# Patient Record
Sex: FEMALE | Race: WHITE | NOT HISPANIC OR LATINO | Employment: UNEMPLOYED | URBAN - METROPOLITAN AREA
[De-identification: names, ages, dates, MRNs, and addresses within clinical notes are randomized per-mention and may not be internally consistent; named-entity substitution may affect disease eponyms.]

---

## 2017-10-08 ENCOUNTER — HOSPITAL ENCOUNTER (EMERGENCY)
Facility: HOSPITAL | Age: 52
Discharge: HOME | End: 2017-10-08
Admitting: EMERGENCY MEDICINE
Payer: COMMERCIAL

## 2017-10-08 VITALS
DIASTOLIC BLOOD PRESSURE: 79 MMHG | HEART RATE: 76 BPM | RESPIRATION RATE: 20 BRPM | OXYGEN SATURATION: 98 % | TEMPERATURE: 97.5 F | WEIGHT: 196.21 LBS | SYSTOLIC BLOOD PRESSURE: 129 MMHG

## 2017-10-08 DIAGNOSIS — L01.00 IMPETIGO ANY SITE: Primary | ICD-10-CM

## 2017-10-08 PROCEDURE — 99282 EMERGENCY DEPT VISIT SF MDM: CPT

## 2017-10-08 RX ORDER — IBUPROFEN 600 MG/1
600 TABLET ORAL ONCE
Status: COMPLETED | OUTPATIENT
Start: 2017-10-08 | End: 2017-10-08

## 2017-10-08 RX ORDER — CEPHALEXIN 500 MG/1
500 CAPSULE ORAL EVERY 6 HOURS SCHEDULED
Qty: 40 CAPSULE | Refills: 0 | Status: SHIPPED | OUTPATIENT
Start: 2017-10-08 | End: 2017-10-18

## 2017-10-08 RX ORDER — CEPHALEXIN 250 MG/1
500 CAPSULE ORAL ONCE
Status: COMPLETED | OUTPATIENT
Start: 2017-10-08 | End: 2017-10-08

## 2017-10-08 RX ADMIN — MUPIROCIN: 20 OINTMENT TOPICAL at 05:00

## 2017-10-08 RX ADMIN — CEPHALEXIN 500 MG: 250 CAPSULE ORAL at 04:53

## 2017-10-08 RX ADMIN — IBUPROFEN 600 MG: 600 TABLET, FILM COATED ORAL at 04:59

## 2017-10-08 NOTE — DISCHARGE INSTRUCTIONS
You appear to have a facial infection which is impetigo  You have been prescribed keflex - take as prescribed  You have been prescribed Bactroban ointment - apply to face two times daily x 5 days  You are to wash your face with soap and water   See your PCP for follow up      [id number] [address]:   Impetigo is a skin infection caused by bacteria  The infection can cause sores to form anywhere on your body  The sores develop watery or pus-filled blisters that break and form thick crusts  Impetigo is most common in children and spreads easily from person to person  DISCHARGE INSTRUCTIONS:   Return to the emergency department if:   · You have painful, red, warm skin around the blisters  · Your face is swollen  · You urinate less than usual or there is blood in your urine  Contact your healthcare provider if:   · You have a fever  · The sores become more red, swollen, warm, or tender  · The sores do not start to heal after 3 days of treatment  · You have questions or concerns about your condition or care  Medicines:   · Antibiotics  treat the bacterial infection  Antibiotics may be given as a pill or cream  Wash your skin and gently remove any crusts before you apply the antibiotic cream      · Take your medicine as directed  Contact your healthcare provider if you think your medicine is not helping or if you have side effects  Tell him or her if you are allergic to any medicine  Keep a list of the medicines, vitamins, and herbs you take  Include the amounts, and when and why you take them  Bring the list or the pill bottles to follow-up visits  Carry your medicine list with you in case of an emergency  Prevent the spread of impetigo:   · Avoid direct contact  You can spread impetigo if someone touches or uses something that touched your infected skin  You can also spread impetigo on your own body when you touch the area and then touch somewhere else   Keep the sores covered with gauze so you will not scratch or touch them  Keep your fingernails short  Your child may need to wear mittens so he does not scratch his sores  · Wash your hands often  Always wash your hands after you touch the infected area  Wash your hands before you touch food, your eyes, or other people  If no water is available, use an alcohol-based gel to clean your hands  · Wash household items  Do not share or reuse items that have come in contact with impetigo sores  Examples include bedding, towels, washcloths, and eating utensils  These items may be used again after they have been washed with hot water and soap  Clean your sores safely:  Wash your skin sores with antibacterial soap and water  You may need to do this 2 to 3 times each day until the sores heal  If the area is crusted, gently wash the sores with gauze or a clean washcloth to remove the crust  Pat the area dry with a clean towel  Wash your hands, the washcloth, and the towel after you clean the area around the sores  Return to work or school: You may return to work or school 48 hours after you start the antibiotic medicine  If your child has impetigo, tell his school or  center about the infection  Follow up with your healthcare provider as directed:  Write down your questions so you remember to ask them during your visits  © 2017 [address] [employer] Information is for End User's use only and may not be sold, redistributed or otherwise used for commercial purposes  All illustrations and images included in CareNotes® are the copyrighted property of A D [employer] M [employer]  or [employer]  The above information is an  only  It is not intended as medical advice for individual conditions or treatments  Talk to your doctor, nurse or pharmacist before following any medical regimen to see if it is safe and effective for you

## 2017-10-08 NOTE — ED PROVIDER NOTES
History  Chief Complaint   Patient presents with    Rash     This is a 47year old female who daughter states is a  and gets "hair splinters" from cutting hair  Daughter states that pt states she feels like there are pieces of hair in her face  Pt states she has hair splinters in her face and that she also has hair in her chin and she has been scratching and now has her face infected  Pt has calamine lotion on face  Pt states she is in a lot of pain  History provided by:  Patient and relative  History limited by:  Psychiatric disorder   used: No    Rash   Location:  Face  Facial rash location:  Face  Quality: blistering, draining and painful    Pain details:     Quality:  Burning    Severity:  Severe    Onset quality:  Gradual    Progression:  Waxing and waning      None       Past Medical History:   Diagnosis Date    Psychiatric disorder        History reviewed  No pertinent surgical history  History reviewed  No pertinent family history  I have reviewed and agree with the history as documented  Social History   Substance Use Topics    Smoking status: Former Smoker    Smokeless tobacco: Not on file    Alcohol use No        Review of Systems   Constitutional: Negative  HENT: Negative  Eyes: Negative  Respiratory: Negative  Cardiovascular: Negative  Gastrointestinal: Negative  Endocrine: Negative  Genitourinary: Negative  Musculoskeletal: Negative  Skin: Positive for rash and wound  Allergic/Immunologic: Negative  Neurological: Negative  Hematological: Negative  Psychiatric/Behavioral: Negative          Physical Exam  ED Triage Vitals [10/08/17 0437]   Temperature Pulse Respirations Blood Pressure SpO2   97 5 °F (36 4 °C) 77 20 129/79 98 %      Temp Source Heart Rate Source Patient Position - Orthostatic VS BP Location FiO2 (%)   Oral Monitor Sitting Left arm --      Pain Score       1           Physical Exam Constitutional: She appears well-developed and well-nourished  She appears distressed  Pt is ranting about hair splinters in her face  She is difficult to redirect  She goes from one topic to another  HENT:   Head:       Eyes: EOM are normal  Pupils are equal, round, and reactive to light  Neck: Normal range of motion  Neck supple  Skin: Skin is warm  Capillary refill takes less than 2 seconds  She is not diaphoretic  Pt has calamine lotion on face  There is a 2 5 cm round area of excoriation and drainage on the left chin area, + redness  Psychiatric:   Pt is anxious, perseveration on rash  States that "men were in her room, groping her"  Daughter states pt gets like this when she becomes stressed  Nursing note and vitals reviewed  ED Medications  Medications   cephalexin (KEFLEX) capsule 500 mg (500 mg Oral Given 10/8/17 0453)   ibuprofen (MOTRIN) tablet 600 mg (600 mg Oral Given 10/8/17 0459)       Diagnostic Studies  Labs Reviewed - No data to display    No orders to display       Procedures  Procedures      Phone Contacts  ED Phone Contact    ED Course  ED Course                                MDM  Number of Diagnoses or Management Options  Impetigo any site:   Diagnosis management comments: Impetigo on face from scratching - Infection of skin    Bactroban ointment   Keflex  Pt and daughter verbalize understanding of d/c instructions     CritCare Time    Disposition  Final diagnoses:   Impetigo any site     ED Disposition     ED Disposition Condition Comment    Discharge  Janes Cluck discharge to home/self care      Condition at discharge: Good        Follow-up Information     Follow up With Specialties Details Why Contact Info Additional [id number] Mervin Gabriel DO Family Medicine Schedule an appointment as soon as possible for a visit in 2 days  [address] [place] [address] Emergency Department Emergency Medicine  If symptoms worsen [address] [facility]  [phone] [place] ED, [address] [place], [place], [address]        Discharge Medication List as of 10/8/2017  4:52 AM      START taking these medications    Details   cephalexin (KEFLEX) 500 mg capsule Take 1 capsule by mouth every 6 (six) hours for 10 days, Starting Sun 10/8/2017, Until Wed 10/18/2017, Print           No discharge procedures on file      ED Provider  Electronically Signed by       LUAN Ho  10/08/17 5027

## 2017-12-28 ENCOUNTER — APPOINTMENT (EMERGENCY)
Dept: CT IMAGING | Facility: HOSPITAL | Age: 52
DRG: 053 | End: 2017-12-28
Payer: COMMERCIAL

## 2017-12-28 ENCOUNTER — HOSPITAL ENCOUNTER (INPATIENT)
Facility: HOSPITAL | Age: 52
LOS: 1 days | Discharge: HOME | DRG: 053 | End: 2017-12-29
Attending: EMERGENCY MEDICINE | Admitting: INTERNAL MEDICINE
Payer: COMMERCIAL

## 2017-12-28 DIAGNOSIS — R41.82 ALTERED MENTAL STATUS: ICD-10-CM

## 2017-12-28 DIAGNOSIS — R55 SYNCOPE: ICD-10-CM

## 2017-12-28 DIAGNOSIS — R56.9 SEIZURE-LIKE ACTIVITY (HCC): ICD-10-CM

## 2017-12-28 DIAGNOSIS — F41.8 ANXIETY WITH DEPRESSION: ICD-10-CM

## 2017-12-28 DIAGNOSIS — F19.10 SUBSTANCE ABUSE (HCC): Primary | ICD-10-CM

## 2017-12-28 DIAGNOSIS — R56.9 SEIZURE (HCC): ICD-10-CM

## 2017-12-28 DIAGNOSIS — F14.10 COCAINE ABUSE (HCC): ICD-10-CM
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
## 2017-12-28 PROBLEM — D72.829 LEUKOCYTOSIS: Status: ACTIVE | Noted: 2017-12-28

## 2017-12-28 LAB
ALBUMIN SERPL BCP-MCNC: 3.5 G/DL (ref 3.5–5)
ALP SERPL-CCNC: 82 U/L (ref 46–116)
ALT SERPL W P-5'-P-CCNC: 38 U/L (ref 12–78)
AMPHETAMINES SERPL QL SCN: POSITIVE
ANION GAP SERPL CALCULATED.3IONS-SCNC: 21 MMOL/L (ref 4–13)
APAP SERPL-MCNC: <2 UG/ML (ref 10–30)
APTT PPP: 28 SECONDS (ref 23–35)
AST SERPL W P-5'-P-CCNC: 27 U/L (ref 5–45)
ATRIAL RATE: 103 BPM
BACTERIA UR QL AUTO: ABNORMAL /HPF
BARBITURATES UR QL: NEGATIVE
BASOPHILS # BLD AUTO: 0.02 THOUSANDS/ΜL (ref 0–0.1)
BASOPHILS NFR BLD AUTO: 0 % (ref 0–1)
BENZODIAZ UR QL: NEGATIVE
BILIRUB DIRECT SERPL-MCNC: 0.07 MG/DL (ref 0–0.2)
BILIRUB SERPL-MCNC: 0.2 MG/DL (ref 0.2–1)
BILIRUB UR QL STRIP: NEGATIVE
BUN SERPL-MCNC: 22 MG/DL (ref 5–25)
CALCIUM SERPL-MCNC: 9.8 MG/DL (ref 8.3–10.1)
CHLORIDE SERPL-SCNC: 104 MMOL/L (ref 100–108)
CLARITY UR: ABNORMAL
CO2 SERPL-SCNC: 18 MMOL/L (ref 21–32)
COCAINE UR QL: POSITIVE
COLOR UR: YELLOW
CREAT SERPL-MCNC: 1.09 MG/DL (ref 0.6–1.3)
EOSINOPHIL # BLD AUTO: 0.07 THOUSAND/ΜL (ref 0–0.61)
EOSINOPHIL NFR BLD AUTO: 1 % (ref 0–6)
ERYTHROCYTE [DISTWIDTH] IN BLOOD BY AUTOMATED COUNT: 13.8 % (ref 11.6–15.1)
ETHANOL SERPL-MCNC: <3 MG/DL (ref 0–3)
ETHYLENE GLYCOL SERPLBLD-MCNC: NEGATIVE UG/ML
GFR SERPL CREATININE-BSD FRML MDRD: 59 ML/MIN/1.73SQ M
GLUCOSE SERPL-MCNC: 103 MG/DL (ref 65–140)
GLUCOSE SERPL-MCNC: 129 MG/DL (ref 65–140)
GLUCOSE UR STRIP-MCNC: NEGATIVE MG/DL
GLYCOLATE SERPL-MCNC: NEGATIVE
HCT VFR BLD AUTO: 42 % (ref 34.8–46.1)
HGB BLD-MCNC: 13.8 G/DL (ref 11.5–15.4)
HGB UR QL STRIP.AUTO: ABNORMAL
INR PPP: 1.01 (ref 0.86–1.16)
KETONES UR STRIP-MCNC: NEGATIVE MG/DL
LEUKOCYTE ESTERASE UR QL STRIP: ABNORMAL
LYMPHOCYTES # BLD AUTO: 1.51 THOUSANDS/ΜL (ref 0.6–4.47)
LYMPHOCYTES NFR BLD AUTO: 13 % (ref 14–44)
MCH RBC QN AUTO: 29.6 PG (ref 26.8–34.3)
MCHC RBC AUTO-ENTMCNC: 32.9 G/DL (ref 31.4–37.4)
MCV RBC AUTO: 90 FL (ref 82–98)
METHADONE UR QL: NEGATIVE
MONOCYTES # BLD AUTO: 0.41 THOUSAND/ΜL (ref 0.17–1.22)
MONOCYTES NFR BLD AUTO: 3 % (ref 4–12)
NEUTROPHILS # BLD AUTO: 10.11 THOUSANDS/ΜL (ref 1.85–7.62)
NEUTS SEG NFR BLD AUTO: 83 % (ref 43–75)
NITRITE UR QL STRIP: NEGATIVE
NON-SQ EPI CELLS URNS QL MICRO: ABNORMAL /HPF
NRBC BLD AUTO-RTO: 0 /100 WBCS
OPIATES UR QL SCN: NEGATIVE
OSMOLALITY UR/SERPL-RTO: 305 MMOL/KG (ref 282–298)
P AXIS: 63 DEGREES
PCP UR QL: NEGATIVE
PH UR STRIP.AUTO: 5.5 [PH] (ref 4.5–8)
PLATELET # BLD AUTO: 336 THOUSANDS/UL (ref 149–390)
PMV BLD AUTO: 10.2 FL (ref 8.9–12.7)
POTASSIUM SERPL-SCNC: 3.6 MMOL/L (ref 3.5–5.3)
PR INTERVAL: 156 MS
PROT SERPL-MCNC: 6.5 G/DL (ref 6.4–8.2)
PROT UR STRIP-MCNC: ABNORMAL MG/DL
PROTHROMBIN TIME: 13.3 SECONDS (ref 12.1–14.4)
QRS AXIS: 44 DEGREES
QRSD INTERVAL: 88 MS
QT INTERVAL: 308 MS
QTC INTERVAL: 403 MS
RBC # BLD AUTO: 4.66 MILLION/UL (ref 3.81–5.12)
RBC #/AREA URNS AUTO: ABNORMAL /HPF
SALICYLATES SERPL-MCNC: <3 MG/DL (ref 3–20)
SODIUM SERPL-SCNC: 143 MMOL/L (ref 136–145)
SP GR UR STRIP.AUTO: 1.02 (ref 1–1.03)
SPECIMEN SOURCE: NORMAL
T WAVE AXIS: 53 DEGREES
THC UR QL: NEGATIVE
TROPONIN I BLD-MCNC: 0 NG/ML (ref 0–0.08)
UROBILINOGEN UR QL STRIP.AUTO: 0.2 E.U./DL
VENTRICULAR RATE: 103 BPM
WBC # BLD AUTO: 12.12 THOUSAND/UL (ref 4.31–10.16)
WBC #/AREA URNS AUTO: ABNORMAL /HPF

## 2017-12-28 PROCEDURE — 85730 THROMBOPLASTIN TIME PARTIAL: CPT | Performed by: EMERGENCY MEDICINE

## 2017-12-28 PROCEDURE — 81002 URINALYSIS NONAUTO W/O SCOPE: CPT | Performed by: EMERGENCY MEDICINE

## 2017-12-28 PROCEDURE — 84484 ASSAY OF TROPONIN QUANT: CPT

## 2017-12-28 PROCEDURE — 80307 DRUG TEST PRSMV CHEM ANLYZR: CPT | Performed by: EMERGENCY MEDICINE

## 2017-12-28 PROCEDURE — 93005 ELECTROCARDIOGRAM TRACING: CPT | Performed by: EMERGENCY MEDICINE

## 2017-12-28 PROCEDURE — 80329 ANALGESICS NON-OPIOID 1 OR 2: CPT | Performed by: EMERGENCY MEDICINE

## 2017-12-28 PROCEDURE — 81001 URINALYSIS AUTO W/SCOPE: CPT

## 2017-12-28 PROCEDURE — 96361 HYDRATE IV INFUSION ADD-ON: CPT

## 2017-12-28 PROCEDURE — 36415 COLL VENOUS BLD VENIPUNCTURE: CPT | Performed by: EMERGENCY MEDICINE

## 2017-12-28 PROCEDURE — 96375 TX/PRO/DX INJ NEW DRUG ADDON: CPT

## 2017-12-28 PROCEDURE — 70450 CT HEAD/BRAIN W/O DYE: CPT

## 2017-12-28 PROCEDURE — 85025 COMPLETE CBC W/AUTO DIFF WBC: CPT | Performed by: EMERGENCY MEDICINE

## 2017-12-28 PROCEDURE — 80076 HEPATIC FUNCTION PANEL: CPT | Performed by: INTERNAL MEDICINE

## 2017-12-28 PROCEDURE — 96365 THER/PROPH/DIAG IV INF INIT: CPT

## 2017-12-28 PROCEDURE — 82693 ASSAY OF ETHYLENE GLYCOL: CPT | Performed by: EMERGENCY MEDICINE

## 2017-12-28 PROCEDURE — 80320 DRUG SCREEN QUANTALCOHOLS: CPT | Performed by: EMERGENCY MEDICINE

## 2017-12-28 PROCEDURE — 80048 BASIC METABOLIC PNL TOTAL CA: CPT | Performed by: EMERGENCY MEDICINE

## 2017-12-28 PROCEDURE — 85610 PROTHROMBIN TIME: CPT | Performed by: EMERGENCY MEDICINE

## 2017-12-28 PROCEDURE — 72125 CT NECK SPINE W/O DYE: CPT

## 2017-12-28 PROCEDURE — 99285 EMERGENCY DEPT VISIT HI MDM: CPT

## 2017-12-28 PROCEDURE — 93005 ELECTROCARDIOGRAM TRACING: CPT

## 2017-12-28 PROCEDURE — 82948 REAGENT STRIP/BLOOD GLUCOSE: CPT

## 2017-12-28 PROCEDURE — 83930 ASSAY OF BLOOD OSMOLALITY: CPT | Performed by: EMERGENCY MEDICINE

## 2017-12-28 RX ORDER — LEVOTHYROXINE SODIUM 0.07 MG/1
75 TABLET ORAL
COMMUNITY

## 2017-12-28 RX ORDER — LORAZEPAM 2 MG/ML
INJECTION INTRAMUSCULAR
Status: COMPLETED
Start: 2017-12-28 | End: 2017-12-28

## 2017-12-28 RX ORDER — ACETAMINOPHEN 325 MG/1
650 TABLET ORAL EVERY 6 HOURS PRN
Status: DISCONTINUED | OUTPATIENT
Start: 2017-12-28 | End: 2017-12-29 | Stop reason: HOSPADM

## 2017-12-28 RX ORDER — METHOCARBAMOL 500 MG/1
500 TABLET, FILM COATED ORAL EVERY 6 HOURS PRN
Status: DISCONTINUED | OUTPATIENT
Start: 2017-12-28 | End: 2017-12-29 | Stop reason: HOSPADM

## 2017-12-28 RX ORDER — ONDANSETRON 2 MG/ML
4 INJECTION INTRAMUSCULAR; INTRAVENOUS EVERY 6 HOURS PRN
Status: DISCONTINUED | OUTPATIENT
Start: 2017-12-28 | End: 2017-12-29 | Stop reason: HOSPADM

## 2017-12-28 RX ORDER — LORAZEPAM 2 MG/ML
1 INJECTION INTRAMUSCULAR 4 TIMES DAILY PRN
Status: DISCONTINUED | OUTPATIENT
Start: 2017-12-28 | End: 2017-12-29 | Stop reason: HOSPADM

## 2017-12-28 RX ORDER — HYDRALAZINE HYDROCHLORIDE 20 MG/ML
10 INJECTION INTRAMUSCULAR; INTRAVENOUS EVERY 6 HOURS PRN
Status: DISCONTINUED | OUTPATIENT
Start: 2017-12-28 | End: 2017-12-29 | Stop reason: HOSPADM

## 2017-12-28 RX ORDER — LEVOTHYROXINE SODIUM 0.07 MG/1
75 TABLET ORAL
Status: DISCONTINUED | OUTPATIENT
Start: 2017-12-29 | End: 2017-12-29 | Stop reason: HOSPADM

## 2017-12-28 RX ORDER — LORAZEPAM 2 MG/ML
2 INJECTION INTRAMUSCULAR ONCE
Status: COMPLETED | OUTPATIENT
Start: 2017-12-28 | End: 2017-12-28

## 2017-12-28 RX ORDER — SODIUM CHLORIDE 9 MG/ML
75 INJECTION, SOLUTION INTRAVENOUS ONCE
Status: COMPLETED | OUTPATIENT
Start: 2017-12-28 | End: 2017-12-29

## 2017-12-28 RX ORDER — CALCIUM CARBONATE 200(500)MG
1000 TABLET,CHEWABLE ORAL DAILY PRN
Status: DISCONTINUED | OUTPATIENT
Start: 2017-12-28 | End: 2017-12-29 | Stop reason: HOSPADM

## 2017-12-28 RX ORDER — SERTRALINE HYDROCHLORIDE 100 MG/1
150 TABLET, FILM COATED ORAL DAILY
COMMUNITY

## 2017-12-28 RX ADMIN — ACETAMINOPHEN 650 MG: 325 TABLET, FILM COATED ORAL at 18:31

## 2017-12-28 RX ADMIN — SODIUM CHLORIDE 75 ML/HR: 0.9 INJECTION, SOLUTION INTRAVENOUS at 19:24

## 2017-12-28 RX ADMIN — LORAZEPAM 2 MG: 2 INJECTION INTRAMUSCULAR at 14:32

## 2017-12-28 RX ADMIN — SODIUM CHLORIDE 1000 ML: 0.9 INJECTION, SOLUTION INTRAVENOUS at 16:35

## 2017-12-28 RX ADMIN — SODIUM CHLORIDE 1000 ML: 0.9 INJECTION, SOLUTION INTRAVENOUS at 15:15

## 2017-12-28 RX ADMIN — LORAZEPAM 2 MG: 2 INJECTION INTRAMUSCULAR; INTRAVENOUS at 14:32

## 2017-12-28 RX ADMIN — LEVETIRACETAM 1000 MG: 100 INJECTION, SOLUTION INTRAVENOUS at 14:57

## 2017-12-28 NOTE — H&P
History and Physical - [address] [place] [address] Internal Medicine    Patient Information: Rukhsana Monique 47 y o  female MRN: [id number]  Unit/Bed#: E4 -01 Encounter: [id number]  Admitting Physician: Efrain Treviño PA-C  PCP: Maik Sanchez DO  Date of Admission:  12/28/17    Assessment/Plan  Patient is a 60-year-old female who presents after a fall down stairs  The patient claims that she slipped in her socks and fell down several stairs  She initially denied hitting her head however she upon arrival appeared confused and complained of a headache  In the ER initially her blood pressure was 175/75, it came down to the 140s 150s with fluids  She had some tachycardia that had since resolved  CT of her head and C-spine did not show any acute intracranial abnormality, fracture or malalignment of the C-spine  Labs were notable for white blood cell count 12 2, CO2 18, anion gap 21, urine drug screen positive for cocaine and amphetamines methamphetamines, alcohol level less than 3  Troponin negative and  beats per minute sinus tachycardia with nonspecific ST changes  Urinalysis showed negative nitrates, small leukocytes, innumerable epithelial cells and moderate bacteria    In the ER the patient then did have a witnessed tonic colonic seizure the patient was given a 1000 mg of Keppra and then 2 mg of Ativan and resolved      Hospital Problem List:     Principal Problem:    Seizure [facility])  Active Problems:    Anxiety with depression    Alcohol related seizure ([facility] [address] )    Fall    Cocaine abuse    Hypothyroidism (acquired)    Leukocytosis      Plan for the Primary Problem(s):  1  Seizure-likely drug induced and/or alcohol withdrawal related  The patient has a prior history of alcohol withdrawal seizures  She admits to drinking a jar of moonshine which is evidently a brand of hard alcohol    Upon further questioning and being told that her urine drug screen was positive she admitted to doing cocaine approximately 50 dollars worth  She denies doing any amphetamines unless it was in the cocaine and she was unaware of it  PDMP was queried and there were no amphetamines prescribed and no opioids since July 2017  The patient will be placed on seizure precautions, neurochecks Q 4, and Ativan p r n  for seizures only  The patient may have Robaxin p r n  as a muscle relaxant  We will kindly consult Neurology    Plan for Additional Problems:   2   Fall-likely secondary to alcohol and drug abuse  No signs of fracture  Neurochecks Q 4  3  Headache-possibly related to numbers 1 and 2  Tylenol only, nursing communication no opioids  4  Alcohol and cocaine abuse - psychiatric consult  The patient has been self medicating secondary to depression and home stressors  Will also ask case management to see the patient  5  Hypothyroidism-check a TSH  Continue the patient's Synthroid  6  Anxiety with depression-No SI or HI  The patient records from [facility] claims that she takes 150 mg of Zoloft  However she claims that she has not been taking it recently  Appreciate Psychiatry input  7  Leukocytosis-mild  12 2  The patient is afebrile there is some bacteria in her urine however she was not complaining of any dysuria  Will follow the cultures but hold on any antibiotics for now  Suspect the elevation is likely reactionary    VTE Prophylaxis: Heparin   Code Status: full code    Anticipated Length of Stay:  Patient will be admitted on an Inpatient basis with an anticipated length of stay of  Greater than 2 midnights  Total Time for Visit, including Counseling / Coordination of Care: 45 minutes  Greater than 50% of this total time spent on direct patient counseling and coordination of care  Chief Complaint:     Fall    History of Present Illness:    Shonda Zambrano is a 47 y o  female who presents status post fall down steps with altered mental status  The patient claims that she slipped and fell in her socks  However upon further questioning she does admit to drinking alcohol with a jar of moonshine as well as having 50 dollars worth of cocaine  She denies any amphetamine or methamphetamine use  She does admit that she has had seizures in the past related to alcohol but none recently  She denies any suicidal ideation or homicidal ideation  She claims that she was in her usual state of health yesterday without any fevers chills or recent illnesses  No dysuria or urinary retention  No abdominal pain nausea vomiting diarrhea constipation no substernal chest pain shortness of breath coughing or palpitations  She does admit to being under more stress than usual   There are some problems with her daughter and granddaughter, which she did not elaborate on    Review of Systems:  A 12-point review of systems was done  Please see the HPI for the full details  All other systems negative  Past Medical and Surgical History:     Past Medical History:   Diagnosis Date    Alcohol related seizure ([address] )     Anxiety with depression     Hypothyroidism (acquired)     Psychiatric disorder        History reviewed  No pertinent surgical history      Meds/Allergies:    Current Facility-Administered Medications   Medication Dose Route Frequency Provider Last Rate Last Dose    acetaminophen (TYLENOL) tablet 650 mg  650 mg Oral Q6H PRN Cibola Jonel, PA-C        calcium carbonate (TUMS) chewable tablet 1,000 mg  1,000 mg Oral Daily PRN JOSE L Hunt-C        hydrALAZINE (APRESOLINE) injection 10 mg  10 mg Intravenous Q6H PRN Cibola Senoli, PA-C        LORazepam (ATIVAN) 2 mg/mL injection 1 mg  1 mg Intravenous 4x Daily PRN Cibola Jonel, PA-C        methocarbamol (ROBAXIN) tablet 500 mg  500 mg Oral Q6H PRN Cibola Senick, PA-C        ondansetron (ZOFRAN) injection 4 mg  4 mg Intravenous Q6H PRN Tiffany Senoli, PA-C        sodium chloride 0 9 % bolus 1,000 mL  1,000 mL Intravenous Once Jayshree Hill,  mL/hr at 12/28/17 1635 1,000 mL at 12/28/17 1635    sodium chloride 0 9 % infusion  75 mL/hr Intravenous Once U S  RODNEY Fleming         No Known Allergies  History:     Marital Status:      Substance Use History:   History   Alcohol Use No     History   Smoking Status    Former Smoker   Smokeless Tobacco    Never Used     History   Drug Use No       Family History:    History reviewed  No pertinent family history  Physical Exam:   Vitals:   Blood Pressure: 157/70 (12/28/17 1722)  Pulse: (!) 107 (12/28/17 1722)  Temperature: 98 2 °F (36 8 °C) (12/28/17 1358)  Temp Source: Oral (12/28/17 1358)  Respirations: 16 (12/28/17 1722)  Weight - Scale: 99 2 kg (218 lb 11 1 oz) (12/28/17 1358)  SpO2: 97 % (12/28/17 1722)    General Appearance:    Alert, cooperative, no distress, tearful at times, appears older than stated age   HEENT:    Atraumatic, EOMs intact, Mucous membranes dry without   exudates   Neck:   Supple, symmetrical, trachea midline, no adenopathy;     thyroid:  no enlargement/tenderness/nodules; no carotid    bruit or JVD   Lungs:     Clear to auscultation bilaterally, respirations unlabored   Chest Wall:    No tenderness or deformity    Heart:    Regular rate and rhythm, S1 and S2 normal, no murmur, rub    or gallop   Abdomen:     Soft, non-tender, bowel sounds decreased due to body habitus, obese, no masses, no organomegaly   Extremities:   Extremities normal, atraumatic, no cyanosis or edema   Pulses:   2+ and symmetric all extremities   Skin:   Skin color, texture, turgor normal, no rashes or lesions   Lymph nodes:   Cervical, supraclavicular, and axillary nodes normal   Neurologic:   CNII-XII intact, normal strength, sensation and reflexes     throughout       Lab Results: I have personally reviewed pertinent reports          Results from last 7 days  Lab Units 12/28/17  1440   WBC Thousand/uL 12 12*   HEMOGLOBIN g/dL 13 8   HEMATOCRIT % 42 0   PLATELETS Thousands/uL 336   NEUTROS PCT % 83*   LYMPHS PCT % 13*   MONOS PCT % 3*   EOS PCT % 1       Results from last 7 days  Lab Units 12/28/17  1440   SODIUM mmol/L 143   POTASSIUM mmol/L 3 6   CHLORIDE mmol/L 104   CO2 mmol/L 18*   BUN mg/dL 22   CREATININE mg/dL 1 09   CALCIUM mg/dL 9 8   GLUCOSE RANDOM mg/dL 129       Results from last 7 days  Lab Units 12/28/17  1440   INR  1 01     UA = 4-10wbc, innumerable epithelial, mod bacteria, small leukocytes, negative nitrites    UDS + cocaine & + amphetamine/meth  EtOH <3      Imaging: I have personally reviewed pertinent reports  Ct Head Without Contrast    Result Date: 12/28/2017  Narrative: CT BRAIN - WITHOUT CONTRAST INDICATION:  50-year-old woman with headache and altered mental status  COMPARISON:  None  TECHNIQUE:  CT examination of the brain was performed  In addition to axial images, coronal reformatted images were created and submitted for interpretation  Radiation dose length product (DLP) for this visit:  1069 mGy-cm   This examination, like all CT scans performed in the [facility], was performed utilizing techniques to minimize radiation dose exposure, including the use of iterative reconstruction and automated exposure control  IMAGE QUALITY:  Diagnostic  FINDINGS:  PARENCHYMA:  No intracranial mass, mass effect or midline shift  No CT signs of acute infarction  There is no parenchymal hemorrhage  VENTRICLES AND EXTRA-AXIAL SPACES:  Normal for patient's age  VISUALIZED ORBITS AND PARANASAL SINUSES:  Unremarkable  CALVARIUM AND EXTRACRANIAL SOFT TISSUES:   Normal      Impression: No acute intracranial abnormality  Workstation performed: [id number]     Ct Spine Cervical Without Contrast    Result Date: 12/28/2017  Narrative: CT CERVICAL SPINE - WITHOUT CONTRAST INDICATION: Patient fell down stairs  COMPARISON: None  TECHNIQUE:  CT examination of the cervical spine was performed without intravenous contrast   Contiguous axial images were obtained  Sagittal and coronal reconstructions were performed  Radiation dose length product (DLP) for this visit:  724 mGy-cm   This examination, like all CT scans performed in the [address] [place] [address], was performed utilizing techniques to minimize radiation dose exposure, including the use of iterative reconstruction and automated exposure control  IMAGE QUALITY:  Diagnostic  FINDINGS: ALIGNMENT:  Normal  VERTEBRAE:  No fracture  No bone destruction or osteoblastic lesion  DISC SPACES:  Loss of height of the disc spaces with vertebral body osteophytes at C5-C6 and C6-C7  Other disc levels unremarkable  FACET JOINTS:  Degenerative facet arthropathy throughout the cervical spine  FORAMINA:   Bony neural foramina normal in caliber  SPINAL CANAL:  Spinal canal normal in caliber  No evidence of hematoma  PREVERTEBRAL AND PARASPINAL SOFT TISSUES:  Normal  OTHER SOFT TISSUES OF THE NECK: Normal  INCLUDED SKULL BASE: Normal  IMAGED PORTIONS OF THE BRAIN: Normal  THORACIC INLET:  Normal  Lung apices clear  Impression:  No cervical spine fracture or traumatic malalignment  Workstation performed: [id number]       EKG, Pathology, and Other Studies Reviewed on Admission:   ECG 12 lead (Preliminary result)   Component (Lab Inquiry)   Collection Time Result Time Vent R Atrial R AL Int  QRSD Int  QT Int  QTC Int  P Axis QRS Axis T Wave Ax    12/28/17 13:59:29 12/28/17 13:59:53 103 103 156 88 308 403 63 44 53       Preliminary result                Narrative:     Age and gender specific ECG analysis   Sinus tachycardia  Nonspecific T wave abnormality  Abnormal ECG  No previous ECGs available                  Allscripts Records Reviewed: Yes     This note has been constructed using a voice recognition system

## 2017-12-28 NOTE — ED PROVIDER NOTES
History  Chief Complaint   Patient presents with    Altered Mental Status     Per EMS pt  slid down her carpeted steps this morning at home and then per daughter after she was having facial twitching and confusion  Denies hitting head, - loc  Pt  currently awake and alert x 2  Repetative when asked questions, appears confused at this time  c/o headache  Patient apparently tells me she syncopized and went down a few carpeted steps; she states approximately half of the stairwell  When she is here; she is very confused she intermittently answer questions and then frequently does not answer; states she forgets  Per medical records appear she has a history of depression and hypothyroidism; however she states she has not taken any medications  And also has listed psychiatric disorder but I am unaware of what that is; as I cannot find it in chart review  She denies chest pain or shortness of breath; denies abdominal pain  Has no external signs of trauma  Has no cervical thoracic lumbar midline tenderness  She keeps stating I have a headache  She has intermittent nystagmus on neurological exam   Motor strength is 5/5 and symmetrical   She is afebrile  She is borderline tachycardic  Her GCS is E4 V4 M6 = 14  Per daughter who is not here; she had some facial twitching right after the fall  Patient again states that she syncopized before the fall  And states headache  Patient did have episode of what was described as tonic-clonic activity that resolved with Ativan  Will give Keppra loading dose  Will do syncope workup and CT head to evaluate for intracranial findings  Differential broad at this time including intracranial injury verses toxidrome versus encephalitis  As patient stated department mental status improved  Admits to using legal [place]hin which she bought at a state store (not made illegally) and cocaine                Prior to Admission Medications   Prescriptions Last Dose Informant Patient Reported? Taking?   levothyroxine 75 mcg tablet   Yes Yes   Sig: Take 75 mcg by mouth daily in the early morning   sertraline (ZOLOFT) 100 mg tablet   Yes Yes   Sig: Take 150 mg by mouth daily      Facility-Administered Medications: None       Past Medical History:   Diagnosis Date    Alcohol related seizure ([address] )     Anxiety with depression     Hypothyroidism (acquired)     Psychiatric disorder        History reviewed  No pertinent surgical history  History reviewed  No pertinent family history  I have reviewed and agree with the history as documented  Social History   Substance Use Topics    Smoking status: Former Smoker    Smokeless tobacco: Never Used    Alcohol use No        Review of Systems   Reason unable to perform ROS: Unsure how reliable as patient appears confused  Constitutional: Negative for activity change, appetite change, chills and fever  HENT: Negative for congestion, ear pain, rhinorrhea, sore throat and trouble swallowing  Eyes: Negative for photophobia and pain  Respiratory: Negative for cough, chest tightness, shortness of breath and wheezing  Cardiovascular: Negative for chest pain, palpitations and leg swelling  Gastrointestinal: Negative for abdominal distention, abdominal pain, constipation, diarrhea, nausea and vomiting  Genitourinary: Negative for dysuria, flank pain, hematuria and urgency  Musculoskeletal: Negative for arthralgias, back pain and joint swelling  Skin: Negative for rash  Neurological: Positive for syncope and headaches  Negative for dizziness, seizures, weakness and light-headedness  Hematological: Negative for adenopathy  Does not bruise/bleed easily  Psychiatric/Behavioral: Negative for confusion, hallucinations and self-injury         Physical Exam  ED Triage Vitals   Temperature Pulse Respirations Blood Pressure SpO2   12/28/17 1358 12/28/17 1358 12/28/17 1358 12/28/17 1358 12/28/17 1358   98 2 °F (36 8 °C) 105 18 (!) 175/75 97 % Temp Source Heart Rate Source Patient Position - Orthostatic VS BP Location FiO2 (%)   12/28/17 1358 12/28/17 1505 12/28/17 1505 12/28/17 1505 --   Oral Monitor Lying Right arm       Pain Score       12/28/17 1358       No Pain           Orthostatic Vital Signs  Vitals:    12/28/17 1606 12/28/17 1722 12/28/17 1753 12/28/17 1943   BP: 140/60 157/70 167/90 149/80   Pulse: 104 (!) 107 103 99   Patient Position - Orthostatic VS: Lying  Lying Lying       Physical Exam   Constitutional: She is oriented to person, place, and time  She appears well-developed and well-nourished  No distress  HENT:   Head: Normocephalic and atraumatic  Right Ear: External ear normal    Left Ear: External ear normal    Mouth/Throat: Oropharynx is clear and moist  No oropharyngeal exudate  Eyes: EOM are normal  Pupils are equal, round, and reactive to light  Right eye exhibits no discharge  Left eye exhibits no discharge  Intermittently has short episodes of nystagmus horizontal not vertical will not keep eyes focused head   Neck: Normal range of motion  Neck supple  No tracheal deviation present  No thyromegaly present  No cervical thoracic lumbar discomfort  No signs of trauma  Full range of motion of neck  Normal flexion extension rotation   Cardiovascular: Normal rate and normal heart sounds  No murmur heard  Pulmonary/Chest: Effort normal and breath sounds normal  No respiratory distress  She has no wheezes  She has no rales  Abdominal: Soft  Bowel sounds are normal  She exhibits no distension  There is no tenderness  There is no rebound and no guarding  Musculoskeletal: Normal range of motion  She exhibits no edema, tenderness or deformity  Lymphadenopathy:     She has no cervical adenopathy  Neurological: She is alert and oriented to person, place, and time  No cranial nerve deficit  She exhibits normal muscle tone  Motor 5/5 and symmetrical  Patient is altered  She is confused    Intermittently responds appropriate  Is oriented to name and date of birth but not place   Skin: Skin is warm  Capillary refill takes less than 2 seconds  No rash noted  No erythema  No pallor     Psychiatric:   Confused       ED Medications  Medications   levothyroxine tablet 75 mcg (not administered)   LORazepam (ATIVAN) 2 mg/mL injection 1 mg (not administered)   acetaminophen (TYLENOL) tablet 650 mg (650 mg Oral Given 12/28/17 1831)   ondansetron (ZOFRAN) injection 4 mg (not administered)   calcium carbonate (TUMS) chewable tablet 1,000 mg (not administered)   hydrALAZINE (APRESOLINE) injection 10 mg (not administered)   methocarbamol (ROBAXIN) tablet 500 mg (not administered)   levETIRAcetam (KEPPRA) 1,000 mg in sodium chloride 0 9 % 100 mL IVPB (0 mg Intravenous Stopped 12/28/17 1514)   LORazepam (ATIVAN) 2 mg/mL injection 2 mg (2 mg Intravenous Given 12/28/17 1432)   sodium chloride 0 9 % bolus 1,000 mL (0 mL Intravenous Stopped 12/28/17 1619)   sodium chloride 0 9 % bolus 1,000 mL (1,000 mL Intravenous New Bag 12/28/17 1635)   sodium chloride 0 9 % infusion (75 mL/hr Intravenous New Bag 12/28/17 1924)       Diagnostic Studies  Results Reviewed     Procedure Component Value Units Date/Time    Hepatic function panel [[id number]]  (Normal) Collected:  12/28/17 1536    Lab Status:  Final result Specimen:  Blood from Arm, Right Updated:  12/28/17 2112     Total Bilirubin 0 20 mg/dL      Bilirubin, Direct 0 07 mg/dL      Alkaline Phosphatase 82 U/L      AST 27 U/L      ALT 38 U/L      Total Protein 6 5 g/dL      Albumin 3 5 g/dL     Osmolality [[id number]]  (Abnormal) Collected:  12/28/17 1536    Lab Status:  Final result Specimen:  Blood from Arm, Right Updated:  12/28/17 2037     Osmolality Serum 305 (H) mmol/KG     Ethylene glycol [[id number]]  (Normal) Collected:  12/28/17 1511    Lab Status:  Final result Specimen:  Blood from Arm, Right Updated:  12/28/17 2010     Ethylene Glycol Lvl Negative     Glycolic Acid Negative    Narrative: Test performed at [facility] [address], [place], [address] [address]     Tony Mckeon JESSICA Haynes  -    [facility], M D [facility] Director   JESSICA Archuleta   -  [facility])   P: [phone]   www [employer]     Urine Microscopic [[id number]]  (Abnormal) Collected:  12/28/17 1556    Lab Status:  Final result Specimen:  Urine from Urine, Clean Catch Updated:  12/28/17 1603     RBC, UA 2-4 (A) /hpf      WBC, UA 4-10 (A) /hpf      Epithelial Cells Innumerable (A) /hpf      Bacteria, UA Moderate (A) /hpf     Rapid drug screen, urine [[id number]]  (Abnormal) Collected:  12/28/17 1539    Lab Status:  Final result Specimen:  Urine from Urine, Clean Catch Updated:  12/28/17 1600     Amph/Meth UR Positive (A)     Barbiturate Ur Negative     Benzodiazepine Urine Negative     Cocaine Urine Positive (A)     Methadone Urine Negative     Opiate Urine Negative     PCP Ur Negative     THC Urine Negative    Narrative:         Presumptive report  If requested, specimen will be sent to reference lab for confirmation  FOR MEDICAL PURPOSES ONLY  IF CONFIRMATION NEEDED PLEASE CONTACT THE LAB WITHIN 5 DAYS      Drug Screen Cutoff Levels:  AMPHETAMINE/METHAMPHETAMINES  1000 ng/mL  BARBITURATES     200 ng/mL  BENZODIAZEPINES     200 ng/mL  COCAINE      300 ng/mL  METHADONE      300 ng/mL  OPIATES      300 ng/mL  PHENCYCLIDINE     25 ng/mL  THC       50 ng/mL    POCT urinalysis dipstick [[id number]]  (Abnormal) Resulted:  12/28/17 1539    Lab Status:  Final result Updated:  12/28/17 1539    ED Urine Macroscopic [[id number]]  (Abnormal) Collected:  12/28/17 1556    Lab Status:  Final result Specimen:  Urine Updated:  12/28/17 1538     Color, UA Yellow     Clarity, UA Turbid     pH, UA 5 5     Leukocytes, UA Small (A)     Nitrite, UA Negative     Protein, UA 30 (1+) (A) mg/dl      Glucose, UA Negative mg/dl      Ketones, UA Negative mg/dl      Urobilinogen, UA 0 2 E U /dl      Bilirubin, UA Negative     Blood, UA Small (A)     Specific [place], UA 1 025    Narrative:       CLINITEK RESULT    Acetaminophen level [[id number]]  (Abnormal) Collected:  12/28/17 1440    Lab Status:  Final result Specimen:  Blood from Arm, Right Updated:  12/28/17 1516     Acetaminophen Level <2 (L) ug/mL     Ethanol [[id number]]  (Normal) Collected:  12/28/17 1440    Lab Status:  Final result Specimen:  Blood from Arm, Right Updated:  12/28/17 1511     Ethanol Lvl <3 mg/dL     Basic metabolic panel [[id number]]  (Abnormal) Collected:  12/28/17 1440    Lab Status:  Final result Specimen:  Blood from Arm, Right Updated:  12/28/17 1503     Sodium 143 mmol/L      Potassium 3 6 mmol/L      Chloride 104 mmol/L      CO2 18 (L) mmol/L      Anion Gap 21 (H) mmol/L      BUN 22 mg/dL      Creatinine 1 09 mg/dL      Glucose 129 mg/dL      Calcium 9 8 mg/dL      eGFR 59 ml/min/1 73sq m     Narrative:         National Kidney Disease Education Program recommendations are as follows:  GFR calculation is accurate only with a steady state creatinine  Chronic Kidney disease less than 60 ml/min/1 73 sq  meters  Kidney failure less than 15 ml/min/1 73 sq  meters  Salicylate level [[id number]]  (Abnormal) Collected:  12/28/17 1440    Lab Status:  Final result Specimen:  Blood from Arm, Right Updated:  38/29/31 3011     Salicylate Lvl <3 (L) mg/dL     APTT [[id number]]  (Normal) Collected:  12/28/17 1440    Lab Status:  Final result Specimen:  Blood from Arm, Right Updated:  12/28/17 1502     PTT 28 seconds     Narrative:          Therapeutic Heparin Range = 60-90 seconds    Protime-INR [[id number]]  (Normal) Collected:  12/28/17 1440    Lab Status:  Final result Specimen:  Blood from Arm, Right Updated:  12/28/17 1502     Protime 13 3 seconds      INR 1 01    POCT troponin [[id number]]  (Normal) Collected:  12/28/17 1440    Lab Status:  Final result Updated:  12/28/17 1453     POC Troponin I 0 00 ng/ml      Specimen Type VENOUS    Narrative: Abbott i-Stat handheld analyzer 99% cutoff is > 0 08ng/mL in [facility]    o cTnI 99% cutoff is useful only when applied to patients in the clinical setting of myocardial ischemia  o cTnI 99% cutoff should be interpreted in the context of clinical history, ECG findings and possibly cardiac imaging to establish correct diagnosis  o cTnI 99% cutoff may be suggestive but clearly not indicative of a coronary event without the clinical setting of myocardial ischemia  CBC and differential [[id number]]  (Abnormal) Collected:  12/28/17 1440    Lab Status:  Final result Specimen:  Blood from Arm, Right Updated:  12/28/17 1445     WBC 12 12 (H) Thousand/uL      RBC 4 66 Million/uL      Hemoglobin 13 8 g/dL      Hematocrit 42 0 %      MCV 90 fL      MCH 29 6 pg      MCHC 32 9 g/dL      RDW 13 8 %      MPV 10 2 fL      Platelets 197 Thousands/uL      nRBC 0 /100 WBCs      Neutrophils Relative 83 (H) %      Lymphocytes Relative 13 (L) %      Monocytes Relative 3 (L) %      Eosinophils Relative 1 %      Basophils Relative 0 %      Neutrophils Absolute 10 11 (H) Thousands/µL      Lymphocytes Absolute 1 51 Thousands/µL      Monocytes Absolute 0 41 Thousand/µL      Eosinophils Absolute 0 07 Thousand/µL      Basophils Absolute 0 02 Thousands/µL     Fingerstick Glucose (POCT) [[id number]]  (Normal) Collected:  12/28/17 1359    Lab Status:  Final result Updated:  12/28/17 1439     POC Glucose 103 mg/dl                  CT spine cervical without contrast   Final Result by Yaniv Cain MD (12/28 1540)       No cervical spine fracture or traumatic malalignment  Workstation performed: [id number]         CT head without contrast   Final Result by Yaniv Cain MD (12/28 1511)      No acute intracranial abnormality           Workstation performed: [id number]               Procedures  ECG 12 Lead Documentation  Date/Time: 12/28/2017 2:41 PM  Performed by: Tanya Suero  Authorized by: Nasir Patel Indications / Diagnosis:  Altered mental status  Patient location:  ED  Previous ECG:     Previous ECG:  Unavailable  Interpretation:     Interpretation: non-specific    Rate:     ECG rate:  103    ECG rate assessment: tachycardic    Rhythm:     Rhythm: sinus rhythm    Ectopy:     Ectopy: none    QRS:     QRS axis:  Normal    QRS intervals:  Normal  Conduction:     Conduction: normal    ST segments:     ST segments:  Normal  T waves:     T waves: normal            Phone Consults  ED Phone Contact    ED Course  ED Course as of Dec 28 2339   Thu Dec 28, 2017   1505 Anion Gap: (!) 21   1506 Pulse: (!) 118   1524 No acute intracranial abnormality  1529 Attempt to call daughter but number not working    [id number]    No cervical spine fracture or traumatic malalignment  [address]: (!) Positive   1614 AMPH/METH: (!) Positive   1614 Can be explained by cocaine use Pulse: (!) 118   1620 After talking with patient about rapid drug screen she does admit to recent cocaine use and possibly amphetamine use as well  She states it is all hazy but does recall using cocaine in past 24 hours  MDM  Number of Diagnoses or Management Options  Altered mental status:   Seizure-like activity [facility]):   Substance abuse:   Syncope:   Diagnosis management comments: Episode of what appears to be syncope; fell down the stairs with altered mental status in department  CT head and neck unremarkable  Found to have anion gap along with altered mental status and had 1 tonic-clonic like seizure in department  Given Keppra and Ativan  Mental status cleared as patient spent time in department  Borderline tachycardia but rest of vital signs within normal limits  Patient admits to recent cocaine use  This was after urine tox screen positive for cocaine and amphetamines  Coma screen unremarkable  No other acute findings    Patient due to new onset seizure activity and toxidrome possibly related to recent cocaine use will be admitted for observation to medicine service  As stated patient was hemodynamically stable did not appear in distress prior to going up to floor  Mental status started clearing prior to leaving department  CritCare Time    Disposition  Final diagnoses:   Substance abuse   Seizure-like activity ([facility] [address] )   Altered mental status   Syncope     Time reflects when diagnosis was documented in both MDM as applicable and the Disposition within this note     Time User Action Codes Description Comment    12/28/2017  4:31 PM Martínez Lucero Add [F19 10] Substance abuse     12/28/2017  4:31 PM Martínez Lucero Add [R56 9] Seizure-like activity ([address] )     12/28/2017  4:31 PM Martínez Lucero Add [R41 82] Altered mental status     12/28/2017  4:31 PM Martínez Lucero Add [R55] Syncope     12/28/2017  5:45 PM Mukul Remy Dr [R56 9] Seizure ([facility] [address] )     12/28/2017  5:46 PM Adriana [address] [F41 8] Anxiety with depression     12/28/2017  5:46 PM Enma Remy Add [F14 10] Cocaine abuse       ED Disposition     ED Disposition Condition Comment    Admit  Case was discussed with [facility] AP and the patient's admission status was agreed to be Admission Status: observation status to the service of Dr Betito Apple   Follow-up Information    None       Current Discharge Medication List      CONTINUE these medications which have NOT CHANGED    Details   levothyroxine 75 mcg tablet Take 75 mcg by mouth daily in the early morning      sertraline (ZOLOFT) 100 mg tablet Take 150 mg by mouth daily           No discharge procedures on file  ED Provider  Attending physically available and evaluated Chio Thomas  I managed the patient along with the ED Attending      Electronically Signed by         Roberto Carlos Perez DO  Resident  12/28/17 4263

## 2017-12-28 NOTE — ED NOTES
Pt sleeping on ED stretcher at this time  Unlabored respirations noted  Pt remains on continuous cardiac and pulse ox monitoring        Willis Babb RN  12/28/17 5970

## 2017-12-28 NOTE — ED NOTES
Upon entering room to obtain iv/blood work pt  Started with full body twitching, eyes rolled into back of head, bit her tongue, and was unresponsive during this episode  Physician called to room immediatly        Noemi Singleton RN  12/28/17 9537

## 2017-12-28 NOTE — ED NOTES
Pt updated on plan of care, agreeable at this time  Pt reports "I just want to go to my room so I can sleep  It's way too loud down here"        Karina Reza RN  12/28/17 8100

## 2017-12-28 NOTE — PLAN OF CARE
DISCHARGE PLANNING     Discharge to home or other facility with appropriate resources Progressing        INFECTION - ADULT     Absence or prevention of progression during hospitalization Progressing     Absence of fever/infection during neutropenic period Progressing        Knowledge Deficit     Patient/family/caregiver demonstrates understanding of disease process, treatment plan, medications, and discharge instructions Progressing        NEUROSENSORY - ADULT     Achieves stable or improved neurological status Progressing     Absence of seizures Progressing     Remains free of injury related to seizures activity Progressing     Achieves maximal functionality and self care Progressing        PAIN - ADULT     Verbalizes/displays adequate comfort level or baseline comfort level Progressing        Potential for Falls     Patient will remain free of falls Progressing        SAFETY ADULT     Maintain or return to baseline ADL function Progressing     Maintain or return mobility status to optimal level Progressing

## 2017-12-29 ENCOUNTER — APPOINTMENT (INPATIENT)
Dept: NEUROLOGY | Facility: HOSPITAL | Age: 52
DRG: 053 | End: 2017-12-29
Payer: COMMERCIAL

## 2017-12-29 ENCOUNTER — GENERIC CONVERSION - ENCOUNTER (OUTPATIENT)
Dept: OTHER | Facility: OTHER | Age: 52
End: 2017-12-29

## 2017-12-29 VITALS
HEIGHT: 65 IN | RESPIRATION RATE: 18 BRPM | BODY MASS INDEX: 36.44 KG/M2 | TEMPERATURE: 97.8 F | OXYGEN SATURATION: 95 % | DIASTOLIC BLOOD PRESSURE: 81 MMHG | HEART RATE: 67 BPM | WEIGHT: 218.7 LBS | SYSTOLIC BLOOD PRESSURE: 144 MMHG

## 2017-12-29 LAB
ANION GAP SERPL CALCULATED.3IONS-SCNC: 8 MMOL/L (ref 4–13)
BUN SERPL-MCNC: 14 MG/DL (ref 5–25)
CALCIUM SERPL-MCNC: 9 MG/DL (ref 8.3–10.1)
CHLORIDE SERPL-SCNC: 107 MMOL/L (ref 100–108)
CO2 SERPL-SCNC: 24 MMOL/L (ref 21–32)
CREAT SERPL-MCNC: 0.79 MG/DL (ref 0.6–1.3)
ERYTHROCYTE [DISTWIDTH] IN BLOOD BY AUTOMATED COUNT: 13.5 % (ref 11.6–15.1)
GFR SERPL CREATININE-BSD FRML MDRD: 87 ML/MIN/1.73SQ M
GLUCOSE SERPL-MCNC: 112 MG/DL (ref 65–140)
HCT VFR BLD AUTO: 36.2 % (ref 34.8–46.1)
HGB BLD-MCNC: 11.9 G/DL (ref 11.5–15.4)
MCH RBC QN AUTO: 29.1 PG (ref 26.8–34.3)
MCHC RBC AUTO-ENTMCNC: 32.9 G/DL (ref 31.4–37.4)
MCV RBC AUTO: 89 FL (ref 82–98)
PLATELET # BLD AUTO: 237 THOUSANDS/UL (ref 149–390)
PMV BLD AUTO: 10.4 FL (ref 8.9–12.7)
POTASSIUM SERPL-SCNC: 4.6 MMOL/L (ref 3.5–5.3)
RBC # BLD AUTO: 4.09 MILLION/UL (ref 3.81–5.12)
SODIUM SERPL-SCNC: 139 MMOL/L (ref 136–145)
TSH SERPL DL<=0.05 MIU/L-ACNC: 2.4 UIU/ML (ref 0.36–3.74)
WBC # BLD AUTO: 5.35 THOUSAND/UL (ref 4.31–10.16)

## 2017-12-29 PROCEDURE — 84443 ASSAY THYROID STIM HORMONE: CPT | Performed by: PHYSICIAN ASSISTANT

## 2017-12-29 PROCEDURE — 85027 COMPLETE CBC AUTOMATED: CPT | Performed by: PHYSICIAN ASSISTANT

## 2017-12-29 PROCEDURE — 80048 BASIC METABOLIC PNL TOTAL CA: CPT | Performed by: PHYSICIAN ASSISTANT

## 2017-12-29 PROCEDURE — 95816 EEG AWAKE AND DROWSY: CPT

## 2017-12-29 RX ORDER — THIAMINE MONONITRATE (VIT B1) 100 MG
100 TABLET ORAL DAILY
Status: DISCONTINUED | OUTPATIENT
Start: 2017-12-29 | End: 2017-12-29 | Stop reason: HOSPADM

## 2017-12-29 RX ORDER — FOLIC ACID 1 MG/1
1 TABLET ORAL DAILY
Status: DISCONTINUED | OUTPATIENT
Start: 2017-12-29 | End: 2017-12-29 | Stop reason: HOSPADM

## 2017-12-29 RX ADMIN — FOLIC ACID 1 MG: 1 TABLET ORAL at 15:57

## 2017-12-29 RX ADMIN — ACETAMINOPHEN 650 MG: 325 TABLET, FILM COATED ORAL at 00:53

## 2017-12-29 RX ADMIN — ACETAMINOPHEN 650 MG: 325 TABLET, FILM COATED ORAL at 14:57

## 2017-12-29 RX ADMIN — METHOCARBAMOL 500 MG: 500 TABLET ORAL at 07:02

## 2017-12-29 RX ADMIN — LEVOTHYROXINE SODIUM 75 MCG: 0.07 TABLET ORAL at 05:34

## 2017-12-29 RX ADMIN — Medication 100 MG: at 15:57

## 2017-12-29 RX ADMIN — ACETAMINOPHEN 650 MG: 325 TABLET, FILM COATED ORAL at 07:02

## 2017-12-29 RX ADMIN — METHOCARBAMOL 500 MG: 500 TABLET ORAL at 00:53

## 2017-12-29 NOTE — CONSULTS
Consultation - Neurology   Sherryle Heinrich 47 y o  female MRN: [id number]  Unit/Bed#: E4 -01 Encounter: [id number]      Assessment/Plan   Assessment:  1  Generalized tonic-clonic seizures, likely after the seizure threshold was lowered by the use of alcohol, cocaine, and methamphetamines but with a likely tendency towards generalized seizures based on her prior workup  2  Polysubstance abuse:  Alcohol, methamphetamines, cocaine  3   history of hypothyroidism, currently untreated  4  History of anxiety and depression    Plan:  1  Routine EEG  2   If EEG abnormalities/epileptiform discharges persist, strongly consider antiepileptic drug therapy in addition to cessation of alcohol use, cocaine, and methamphetamine  This has been discussed, as well as her previous neurologist's recommendations, and at this time she adamantly refuses antiepileptic drugs of any type  3   Substance abuse rehab  4  Add thiamine and folic acid  5  Consider repeating TSH  6  Consider repeat B12  7  If patient does not reconsider use of antiepileptic drug therapy, will not need epilepsy center follow-up  As a 1st step however she has been advised to remove known triggers for her seizures, i e  substance abuse as noted above  8  [employer] reporting form will be completed  8  Further comments and recommendations per the attending neurologist    History of Present Illness     Physician Requesting Consult: Александр Woodall DO  Reason for Consult / Principal Problem:  Seizure  Hx and PE limited by:  None, at times seems to be any mickey jang historian    HPI: Sherryle Heinrich is a 47y o  year old right-handed female who presented to the emergency department at [facility] on 2/28/2017 at 1345 hours via EMS after she fell down carpeted steps then had a seizure    The patient has recall of falling down the steps, as well as her son saying that she had a seizure, then she has no recall until awakening in her bed on the [facility] floor  According to the ED notes, she was found having facial twitching, and was confused  She arrived in the ED with repetitive questioning, as well as complaining of a headache  45 minutes after ED arrival, she had a witnessed seizure, described as twitching and eyes rolling back  She was reported to be unresponsive, and the ED did report a tongue bite  She was given Ativan 2 mg, then loaded with Keppra 1000 mg IV  She was afebrile, modestly tachycardic (heart rate 105), and hypertensive with blood pressure 175/75  Head CT was unremarkable  She was admitted, and Neurology consultation is requested  The patient reports that her seizure occurred in the setting of having ingested a quantity of mood shine, using cocaine, and methamphetamines  She has a history of seizures occurring in the setting of alcohol and cocaine use  She describes a moderately severe headache when she awakened, which is now mild  She denies lateralizing weakness or focal sensory changes  She complains of a side sticker in her right ribs, right hip pain, as well as chronic right shoulder pain (rotator cuff pathology)  She is anxious to be discharged  Her alcohol history of is that of binging  [facility] records were reviewed in [address] [place] [address]  In May 2017 she was seen in the emergency room after collapsing with a witnessed seizure, in the setting of alcohol use  In September 2016, she sustained a fall after alcohol use, with a seizure witnessed by EMS  There are neurology records from 2013 to 2014, when she was seen by Ambar Marina MD of [facility]  He references a normal EEG in 2010,, but by May 2013, her EEG revealed a burst of generalized 4-5 per 2nd spike wave activity  His impression was of a genetic tendency toward seizures,  with the threshold lowered by her alcohol and cocaine use    He again saw her in 2014 by which time she was refusing to take any anti epileptic drugs, due to unpleasant side effects she had had with Keppra and Lamictal   EEG in December 2014 was noted to have a single burst of generalized spike and wave activity, suggesting a generalized seizure disorder  She has not had neurology follow-up since  Inpatient consult to Neurology  Consult performed by: Ashlee Lawrence ordered by: Te Richardson          Review of Systems   Constitutional:        No recent acute illness   HENT: Negative for hearing loss, tinnitus and trouble swallowing  Denies oral trauma   Eyes: Negative for visual disturbance  Cardiovascular: Positive for chest pain (Right-sided rib/chest wall contusion)  Genitourinary: Positive for urgency  Musculoskeletal: Positive for arthralgias  Neurological: Positive for seizures and headaches  Negative for speech difficulty, weakness and numbness  Psychiatric/Behavioral: Positive for dysphoric mood  Negative for sleep disturbance  The patient is nervous/anxious  All other systems reviewed and are negative  Historical Information   Past Medical History:   Diagnosis Date    Alcohol abuse     Alcohol related seizure ([address] )     Anxiety with depression     B12 deficiency     GI bleed     Hypothyroidism (acquired)     Psychiatric disorder     GI bleed, hallucinations, alcohol withdrawal seizures, MRSA infection right knee with septic shock, anemia  History reviewed  No pertinent surgical history  Excision lipoma upper thoracic  Social History   History   Alcohol Use No    Been to alcohol use  History   Drug Use No    Cocaine, methamphetamines  History   Smoking Status    Former Smoker   Smokeless Tobacco    Never Used     Family History:  Osteoporosis, coronary artery disease    Review of previous medical records was  completed       Meds/Allergies   current meds:   Current Facility-Administered Medications   Medication Dose Route Frequency    acetaminophen (TYLENOL) tablet 650 mg  650 mg Oral Q6H PRN    calcium carbonate (TUMS) chewable tablet 1,000 mg  1,000 mg Oral Daily PRN    hydrALAZINE (APRESOLINE) injection 10 mg  10 mg Intravenous Q6H PRN    levothyroxine tablet 75 mcg  75 mcg Oral Early Morning    LORazepam (ATIVAN) 2 mg/mL injection 1 mg  1 mg Intravenous 4x Daily PRN    methocarbamol (ROBAXIN) tablet 500 mg  500 mg Oral Q6H PRN    ondansetron (ZOFRAN) injection 4 mg  4 mg Intravenous Q6H PRN    and PTA meds:   Prior to Admission Medications   Prescriptions Last Dose Informant Patient Reported? Taking?   levothyroxine 75 mcg tablet   Yes Yes   Sig: Take 75 mcg by mouth daily in the early morning   sertraline (ZOLOFT) 100 mg tablet   Yes Yes   Sig: Take 150 mg by mouth daily      Facility-Administered Medications: None    Had not been taking Synthroid or Zoloft for at least 1 month    No Known Allergies    Objective   Vitals:Blood pressure 133/82, pulse 72, temperature 99 3 °F (37 4 °C), temperature source Temporal, resp  rate 18, height 5' 5" (1 651 m), weight 99 2 kg (218 lb 11 1 oz), SpO2 96 %  ,Body mass index is 36 39 kg/m²  Intake/Output Summary (Last 24 hours) at 12/29/17 1337  Last data filed at 12/28/17 1619   Gross per 24 hour   Intake             1100 ml   Output                0 ml   Net             1100 ml       Physical Exam   Constitutional: She is oriented to person, place, and time  She appears well-developed and well-nourished  No distress  HENT:   Head: Normocephalic and atraumatic  Mouth/Throat: Oropharynx is clear and moist    Nontender scalp  No intraoral trauma seen   Eyes: Conjunctivae and EOM are normal  Pupils are equal, round, and reactive to light  No scleral icterus  Neck: Normal range of motion  Neck supple  Carotid bruit is not present  Cardiovascular: Normal rate, regular rhythm and intact distal pulses  No murmur heard  Pulmonary/Chest: Effort normal and breath sounds normal    Abdominal: Soft  Bowel sounds are normal    Musculoskeletal: Normal range of motion  She exhibits no edema  Neurological: She is oriented to person, place, and time  She has a normal Finger-Nose-Finger Test, a normal Heel to [employer] and a normal Romberg Test    Reflex Scores:       Tricep reflexes are 2+ on the right side and 2+ on the left side  Bicep reflexes are 2+ on the right side and 2+ on the left side  Brachioradialis reflexes are 2+ on the right side and 2+ on the left side  Patellar reflexes are 2+ on the right side and 2+ on the left side  Achilles reflexes are 2+ on the right side and 2+ on the left side  Skin: Skin is warm and dry  She is not diaphoretic  Psychiatric: Her speech is normal  Her mood appears anxious  Her affect is angry  Rapid and pressured: somewhat evasive  She expresses impulsivity  Depressed:     Occasionally hypervigilent   Vitals reviewed  Neurologic Exam     Mental Status   Oriented to person, place, and time  Follows 2 step commands  Attention: decreased (Variable attention during the exam, at times disinterested in the exam   Intact reverse order months recitation to March, then unable to continue)  Concentration: decreased  Speech: speech is normal   Able to perform simple calculations (Had difficulty with point age calculations)  Able to name object  Able to read  Able to repeat  Normal comprehension  Cranial Nerves     CN II   Visual acuity: normal  Right visual field deficit: none  Left visual field deficit: none     CN III, IV, VI   Pupils are equal, round, and reactive to light  Extraocular motions are normal    Right pupil: Size: 3 mm  Shape: regular  Reactivity: brisk  Left pupil: Size: 3 mm  Shape: regular  Reactivity: brisk  Nystagmus: right (Few beats of end gaze)   Nystagmus type: rapid  Ophthalmoparesis: none  Conjugate gaze: present    CN V   Facial sensation intact  CN VII   Facial expression full, symmetric       CN VIII   CN VIII normal    Hearing: intact    CN IX, X   CN IX normal    CN X normal    Palate: symmetric    CN XI   CN XI normal    Right sternocleidomastoid strength: normal  Left sternocleidomastoid strength: normal  Right trapezius strength: normal  Left trapezius strength: normal    CN XII   CN XII normal    Tongue deviation: none  Discs not visualized     Motor Exam   Muscle bulk: normal  Overall muscle tone: normal  Right arm pronator drift: absent  Left arm pronator drift: absent    Strength   Strength 5/5 except as noted     Right deltoid: 2/5    Sensory Exam   Light touch normal    Vibration normal    Pinprick normal    Temperature preserved throughout     Gait, Coordination, and Reflexes     Gait  Gait: (Arthritic)    Coordination   Romberg: negative  Finger to nose coordination: normal  Heel to shin coordination: normal    Tremor   Resting tremor: absent  Intention tremor: absent  Action tremor: absent    Reflexes   Right brachioradialis: 2+  Left brachioradialis: 2+  Right biceps: 2+  Left biceps: 2+  Right triceps: 2+  Left triceps: 2+  Right patellar: 2+  Left patellar: 2+  Right achilles: 2+  Left achilles: 2+  Right : 2+  Left : 2+  Right plantar: normal  Left plantar: normal  Right Chopra: absent  Left Chopra: absent  Right ankle clonus: absent  Left ankle clonus: absent          Lab Results:   CBC:   Results from last 7 days  Lab Units 12/29/17  1214 12/28/17  1440   WBC Thousand/uL 5 35 12 12*   RBC Million/uL 4 09 4 66   HEMOGLOBIN g/dL 11 9 13 8   HEMATOCRIT % 36 2 42 0   MCV fL 89 90   PLATELETS Thousands/uL 237 336   , BMP/CMP:   Results from last 7 days  Lab Units 12/29/17  1214 12/28/17  1536 12/28/17  1440   SODIUM mmol/L 139  --  143   POTASSIUM mmol/L 4 6  --  3 6   CHLORIDE mmol/L 107  --  104   CO2 mmol/L 24  --  18*   ANION GAP mmol/L 8  --  21*   BUN mg/dL 14  --  22   CREATININE mg/dL 0 79  --  1 09   GLUCOSE RANDOM mg/dL 112  --  129   CALCIUM mg/dL 9 0  --  9 8   AST U/L  --  27  --    ALT U/L  --  38  --    ALK PHOS U/L  --  82  --    TOTAL PROTEIN g/dL  --  6 5 --    ALBUMIN g/dL  --  3 5  --    BILIRUBIN TOTAL mg/dL  --  0 20  --    EGFR ml/min/1 73sq m 87  --  59   , Urinalysis:   Results from last 7 days  Lab Units 12/28/17  1556   COLOR UA  Yellow   CLARITY UA  Turbid   SPEC GRAV UA  1 025   PH UA  5 5   LEUKOCYTES UA  Small*   NITRITE UA  Negative   PROTEIN UA mg/dl 30 (1+)*   GLUCOSE UA mg/dl Negative   KETONES UA mg/dl Negative   BILIRUBIN UA  Negative   BLOOD UA  Small*   , Drug Screen:   Results from last 7 days  Lab Units 12/28/17  1539   BARBITURATE UR  Negative   BENZODIAZEPINE UR  Negative   THC UR  Negative   COCAINE UR  Positive*   METHADONE URINE  Negative   OPIATE UR  Negative   PCP UR  Negative    Blood toxicology:  Negative alcohol, ethylene glycol, glycolic acid  Urine toxicology also is positive for amphetamine/methamphetamines  Troponin 0 00  Anion gap 21    Imaging Studies: I have personally reviewed pertinent reports  and I have personally reviewed pertinent films in PACS   CT head:No intracranial mass, mass effect or midline shift  No CT signs of acute infarction  There is no parenchymal hemorrhage  VENTRICLES AND EXTRA-AXIAL SPACES:  Normal for patient's age  CT cervical spine:  No cervical spine fracture or traumatic malalignment  Loss of height of the disc spaces with vertebral body osteophytes at C5-C6 and C6-C7  Other disc levels unremarkable  Degenerative facet arthropathy throughout the cervical spine            EKG, Pathology, and Other Studies: I have personally reviewed pertinent reports  EEG:This is an abnormal 34 minutes awake, drowsy, and asleep EEG due to recurrent bursts of diffuse delta activity  This finding is etiologically nonspecific for mild-moderate diffuse cerebral dysfunction       EKG: Sinus tachycardia Nonspecific T wave abnormality Abnormal ECG No previous ECGs available      VTE Prophylaxis: none

## 2017-12-29 NOTE — PROGRESS NOTES
Patient gave permission to give Azam Cesar any updates and information about her  Patient wanted Harry Walker to be her emergency contact over her son and daughter whom are listed

## 2017-12-29 NOTE — PROGRESS NOTES
Progress Note - Nohelia Lamb 47 y o  female MRN: [id number]    Unit/Bed#: E4 -01 Encounter: [id number]    Assessment/Plan:    General tonic colonic seizures seizure threshold Lower by use of alcohol cocaine and amphetamines and likely tendency toward generalized seizure in prior workups await suggestion from Neurology for antiseizure medications    Polysubstance abuse   no signs of withdrawal today would recommend discontinuation of substance abuse    Anxiety depression   continue Ativan p r n  Hypothyroidism   continue Synthroid 75 micrograms daily       Subjective:   Feels good today offers no complaints denies chest pain shortness of breath nausea vomiting diarrhea no fevers chills appetite okay    Objective:     Vitals: Blood pressure 133/82, pulse 72, temperature 99 3 °F (37 4 °C), temperature source Temporal, resp  rate 18, height 5' 5" (1 651 m), weight 99 2 kg (218 lb 11 1 oz), SpO2 96 %  ,Body mass index is 36 39 kg/m²          Results from last 7 days  Lab Units 12/29/17  1214 12/28/17  1440   WBC Thousand/uL 5 35 12 12*   HEMOGLOBIN g/dL 11 9 13 8   HEMATOCRIT % 36 2 42 0   PLATELETS Thousands/uL 237 336   INR   --  1 01       Results from last 7 days  Lab Units 12/29/17  1214 12/28/17  1536   SODIUM mmol/L 139  --    POTASSIUM mmol/L 4 6  --    CHLORIDE mmol/L 107  --    CO2 mmol/L 24  --    BUN mg/dL 14  --    CREATININE mg/dL 0 79  --    CALCIUM mg/dL 9 0  --    TOTAL PROTEIN g/dL  --  6 5   BILIRUBIN TOTAL mg/dL  --  0 20   ALK PHOS U/L  --  82   ALT U/L  --  38   AST U/L  --  27   GLUCOSE RANDOM mg/dL 112  --        Scheduled Meds:    folic acid 1 mg Oral Daily   levothyroxine 75 mcg Oral Early Morning   thiamine 100 mg Oral Daily       Continuous Infusions:     Physical exam:  General appearance:  Alert no distress interaction appropriate   Head/Eyes:  Nonicteric PERRL EOMI  Neck:  Supple  Lungs: CTA bilateral no wheezing rhonchi or rales  Heart: normal S1 S2 regular  Abdomen: Soft nontender with bowel sounds  Extremities: no edema  Skin: no rash    Invasive Devices     Peripheral Intravenous Line            Peripheral IV 12/28/17 Right Hand 1 day                  VTE Pharmacologic Prophylaxis:  SCDs   VTE Mechanical Prophylaxis:  SCDs                     Counseling / Coordination of Care  Total floor / unit time spent today  30   minutes  Greater than 50% of total time was spent with the patient and / or family counseling and / or coordination of care    A description of the counseling / coordination of care:

## 2017-12-29 NOTE — CASE MANAGEMENT
Initial Clinical Review    Admission: Date/Time/Statement: 12/28/17 @ 1756 Inpatient Written     Orders Placed This Encounter   Procedures    Place in Observation (expected length of stay for this patient is less than two midnights)     Standing Status:   Standing     Number of Occurrences:   1     Order Specific Question:   Admitting Physician     Answer:   Yara Richards     Order Specific Question:   Level of Care     Answer:   Med Surg [16]    Inpatient Admission     Standing Status:   Standing     Number of Occurrences:   1     Order Specific Question:   Admitting Physician     Answer:   Yara Richards     Order Specific Question:   Level of Care     Answer:   Med Surg [16]     Order Specific Question:   Estimated length of stay     Answer:   More than 2 Midnights     Order Specific Question:   Certification     Answer:   I certify that inpatient services are medically necessary for this patient for a duration of greater than two midnights  See H&P and MD Progress Notes for additional information about the patient's course of treatment  ED: Date/Time/Mode of Arrival:   ED Arrival Information     Expected Arrival Acuity Means of Arrival Escorted By Service Admission Type    - 12/28/2017 13:45 Emergent Ambulance [facility]    Arrival Complaint    Seizure          Chief Complaint:   Chief Complaint   Patient presents with    Altered Mental Status     Per EMS pt  slid down her carpeted steps this morning at home and then per daughter after she was having facial twitching and confusion  Denies hitting head, - loc  Pt  currently awake and alert x 2  Repetative when asked questions, appears confused at this time  c/o headache  History of Illness: Patient apparently tells me she syncopized and went down a few carpeted steps; she states approximately half of the stairwell    When she is here; she is very confused she intermittently answer questions and then frequently does not answer; states she forgets  Per medical records appear she has a history of depression and hypothyroidism; however she states she has not taken any medications  And also has listed psychiatric disorder but am unaware of what that is; as cannot find it in chart review  Has no external signs of trauma  Has no cervical thoracic lumbar midline tenderness  She keeps stating I have a headache  She has intermittent nystagmus on neurological exam   Motor strength is 5/5 and symmetrical   She is afebrile  She is borderline tachycardic  Her GCS is E4 V4 M6 = 14  Per daughter who is not here; she had some facial twitching right after the fall  Patient again states that she syncopized before the fall  And states headache  Patient did have episode of what was described as tonic-clonic activity that resolved with Ativan        As patient stated department mental status improved  Admits to using legal moonshin which she bought at a state store (not made illegally) and cocaine  ED Vital Signs:   ED Triage Vitals   Temperature Pulse Respirations Blood Pressure SpO2   12/28/17 1358 12/28/17 1358 12/28/17 1358 12/28/17 1358 12/28/17 1358   98 2 °F (36 8 °C) 105 18 (!) 175/75 97 %      Temp Source Heart Rate Source Patient Position - Orthostatic VS BP Location FiO2 (%)   12/28/17 1358 12/28/17 1505 12/28/17 1505 12/28/17 1505 --   Oral Monitor Lying Right arm       Pain Score       12/28/17 1358       No Pain        Wt Readings from Last 1 Encounters:   12/28/17 99 2 kg (218 lb 11 1 oz)       Vital Signs (abnormal): temp 100 8, -121    Abnormal Labs/Diagnostic Test Results:   WBC 12 12*   NEUTROS PCT 83*   LYMPHS PCT 13*   MONOS PCT 3*     CO2 18*     Amph/Meth UR  Positive     Cocaine Urine  Positive       CT Head:  WNL  CT Cspine:  WNL  EKG:  Sinus tachycardia  Nonspecific T wave abnormality    Abnormal ECG    ED Treatment:   Medication Administration from 12/28/2017 1345 to 12/28/2017 1736 Date/Time Order Dose Route Action     12/28/2017 1457 levETIRAcetam (KEPPRA) 1,000 mg in sodium chloride 0 9 % 100 mL IVPB 1,000 mg Intravenous New Bag     12/28/2017 1432 LORazepam (ATIVAN) 2 mg/mL injection 2 mg 2 mg Intravenous Given     12/28/2017 1515 sodium chloride 0 9 % bolus 1,000 mL 1,000 mL Intravenous New Bag     12/28/2017 1635 sodium chloride 0 9 % bolus 1,000 mL 1,000 mL Intravenous New Bag          Past Medical/Surgical History: Active Ambulatory Problems     Diagnosis Date Noted    No Active Ambulatory Problems     Resolved Ambulatory Problems     Diagnosis Date Noted    No Resolved Ambulatory Problems     Past Medical History:   Diagnosis Date    Alcohol abuse     Alcohol related seizure ([facility] [address] )     Anxiety with depression     B12 deficiency     GI bleed     Hypothyroidism (acquired)     Psychiatric disorder        Admitting Diagnosis: Substance abuse [F19 10]  Syncope [R55]  Altered mental status [R41 82]  Seizure-like activity ([facility] [address] ) [R56 9]    Age/Sex: 47 y o  female    Assessment/Plan:   Principal Problem:    Seizure ([facility] [address] )  Active Problems:    Anxiety with depression    Alcohol related seizure ([address] )    Fall    Cocaine abuse    Hypothyroidism (acquired)    Leukocytosis     Plan for the Primary Problem(s):  1  Seizure-likely drug induced and/or alcohol withdrawal related  The patient has a prior history of alcohol withdrawal seizures  She admits to drinking a jar of moonshine which is evidently a brand of hard alcohol  Upon further questioning and being told that her urine drug screen was positive she admitted to doing cocaine approximately 50 dollars worth  She denies doing any amphetamines unless it was in the cocaine and she was unaware of it  PDMP was queried and there were no amphetamines prescribed and no opioids since July 2017  The patient will be placed on seizure precautions, neurochecks Q 4, and Ativan p r n  for seizures only  The patient may have Robaxin p r n  as a muscle relaxant  We will kindly consult Neurology     Plan for Additional Problems:   2   Fall-likely secondary to alcohol and drug abuse  No signs of fracture  Neurochecks Q 4  3  Headache-possibly related to numbers 1 and 2  Tylenol only, nursing communication no opioids  4  Alcohol and cocaine abuse - psychiatric consult  The patient has been self medicating secondary to depression and home stressors  Will also ask case management to see the patient  5  Hypothyroidism-check a TSH  Continue the patient's Synthroid  6  Anxiety with depression-No SI or HI  The patient records from [facility] claims that she takes 150 mg of Zoloft  However she claims that she has not been taking it recently  Appreciate Psychiatry input  7  Leukocytosis-mild  12 2  The patient is afebrile there is some bacteria in her urine however she was not complaining of any dysuria  Will follow the cultures but hold on any antibiotics for now  Suspect the elevation is likely reactionary     VTE Prophylaxis: Heparin   Anticipated Length of Stay:  Patient will be admitted on an Inpatient basis with an anticipated length of stay of  Greater than 2 midnights  Admission Orders:  Telemetry  Seizure and Fall precautions  OOB with assistance  Neuro checks q4h  Consult neurology, cm, psychiatry    Scheduled Meds:   levothyroxine 75 mcg Oral Early Morning     Continuous Infusions:    PRN Meds:   Acetaminophen 650mg x3 thus far    calcium carbonate    hydrALAZINE    LORazepam    Methocarbamol 500mg x2 thus far    ondansetron    Thank you,  [address] in the [facility] by Simón Kay for 2017  Network Utilization Review Department  Phone: [phone]; Fax [phone]  ATTENTION: The Network Utilization Review Department is now centralized for our 7 Facilities  Make a note that we have a new phone and fax numbers for our Department   Please call with any questions or concerns to [phone] and carefully follow the prompts so that you are directed to the right person  All voicemails are confidential  Fax any determinations, approvals, denials, and requests for initial or continue stay review clinical to [phone]  Due to HIGH CALL volume, it would be easier if you could please send faxed requests to expedite your requests and in part, help us provide discharge notifications faster

## 2017-12-29 NOTE — PLAN OF CARE
DISCHARGE PLANNING     Discharge to home or other facility with appropriate resources Not Progressing        INFECTION - ADULT     Absence or prevention of progression during hospitalization Not Progressing     Absence of fever/infection during neutropenic period Not Progressing        Knowledge Deficit     Patient/family/caregiver demonstrates understanding of disease process, treatment plan, medications, and discharge instructions Not Progressing        NEUROSENSORY - ADULT     Achieves stable or improved neurological status Not Progressing     Absence of seizures Not Progressing     Remains free of injury related to seizures activity Not Progressing     Achieves maximal functionality and self care Not Progressing        PAIN - ADULT     Verbalizes/displays adequate comfort level or baseline comfort level Not Progressing        Potential for Falls     Patient will remain free of falls Not Progressing        SAFETY ADULT     Maintain or return to baseline ADL function Not Progressing     Maintain or return mobility status to optimal level Not Progressing

## 2017-12-29 NOTE — DISCHARGE SUMMARY
Discharge Summary - Medical Candace Farris 47 y o  female MRN: [id number]    [address] [place] [address]  EEG Room / Bed: [address] 372/I7 -* Encounter: [id number]    BRIEF OVERVIEW    Admitting Provider: Federico Yates DO  Discharge Provider: Federico Yates DO  Admission Date: 12/28/2017     Discharge Date: No discharge date for patient encounter  Primary Care Physician at Discharge: Gabe Amin [phone]    Primary Discharge Diagnosis  Polysubstance abuse  Seizure activity    Other Problems Addressed  Patient Active Problem List    Diagnosis Date Noted    Fall 12/28/2017    Seizure ([facility] [address] ) 12/28/2017    Cocaine abuse 12/28/2017    Leukocytosis 12/28/2017    Anxiety with depression     Alcohol related seizure ([facility] [address] )     Hypothyroidism (acquired)        Consulting Providers   Neurology Dr Anders Gilbert  Therapeutic Operative Procedures Performed  EEG diffuse delta activity nonspecific for mild to moderate diffuse cerebral dysfunction no elileptiform discharges    Discharge Disposition: home    Allergies  No Known Allergies  Diet restrictions:  none   Activity restrictions:  none   Discharge Condition:  Hazel Summers in 1 week   Follow up with consulting providers  [address] [place] [address] as soon as able to schedule     [facility]  Presenting Problem/History of Present Illness  Seizure [facility])  Hospital Course  52-year-old female presents after falling downstairs after consuming moonshine and using cocaine and amphetamine, reported possible tonic clonic seizure    Tonic colonic seizure patient was initially given Keppra and Ativan had no further seizure activity during her admission    She was seen in consultation with Neurology any EEG was performed there recommendation is to follow up at the [facility] as she had previously been doing patient reports that she will not take seizure medications at this time   Neurology notified department of transportation to void her license and she is told not to drive at this time  Alcohol abuse  patient reports a week moment and consumed alcohol and does not do this on a daily basis only occasional bingeing she displayed no signs of withdrawal    Cocaine abuse  again been use not daily and had no signs of withdrawal recommendation to discontinue use forever    Amphetamine abuse binge use recommend discontinue usage for ever      Discharge  Statement   I spent 35 minutes discharging the patient  This time was spent on the day of discharge  I had direct contact with the patient on the day of discharge  Additional documentation is required if more than 30 minutes were spent on discharge  Discussed follow-up with patient and Neurology    Discharge instructions/Information to patient and family:   See after visit summary for information provided to patient and family

## 2017-12-29 NOTE — PLAN OF CARE

## 2017-12-29 NOTE — PLAN OF CARE
CONFUSION/THOUGHT DISTURBANCE     Thought disturbances (confusion, delirium, depression, dementia or psychosis) are managed to maintain or return to baseline mental status and functional level Progressing        COPING     Pt/Family able to verbalize concerns and demonstrate effective coping strategies Progressing     Will report anxiety at manageable levels Progressing        DISCHARGE PLANNING     Discharge to home or other facility with appropriate resources Progressing        INFECTION - ADULT     Absence or prevention of progression during hospitalization Progressing     Absence of fever/infection during neutropenic period Progressing        Knowledge Deficit     Patient/family/caregiver demonstrates understanding of disease process, treatment plan, medications, and discharge instructions Progressing        NEUROSENSORY - ADULT     Achieves stable or improved neurological status Progressing     Absence of seizures Progressing     Remains free of injury related to seizures activity Progressing     Achieves maximal functionality and self care Progressing        PAIN - ADULT     Verbalizes/displays adequate comfort level or baseline comfort level Progressing        Potential for Falls     Patient will remain free of falls Progressing        SAFETY ADULT     Maintain or return to baseline ADL function Progressing     Maintain or return mobility status to optimal level Progressing

## 2017-12-29 NOTE — DISCHARGE INSTRUCTIONS
No swimming, diving, operating heavy machinery, climbing ladders  No tub baths; may shower  Take all anti-seizure medications on schedule as directed, do not miss doses  Contact the neurology office if seizures recur, or problems with medications  Should a seizure last more than 5 minutes or patient fails to recover after 30 minutes or there are multiple seizures in a day or if there is a suspected head injury then EMS should be called or they go to the nearest emergency room  If he only experiences altered awareness, confusion, or focal seizures, then they may call the office for guidance  Seizure first aid consists of preventing the patient from wandering or removal of dangerous objects or prevention of injury, for convulsive seizures, position the patient on the ground, may place a pillow under the head, turn the patient to his side, no objects or reaching for the mouth, no restraints but prevent injuries by removing glasses or sharp/heavy objects, and time the duration of the seizure     No driving    Follow up at [address] [place] [address] seizure clinic ASAP

## 2017-12-29 NOTE — ED ATTENDING ATTESTATION
Amy Joshua MD, saw and evaluated the patient  I have discussed the patient with the resident/non-physician practitioner and agree with the resident's/non-physician practitioner's findings, Plan of Care, and MDM as documented in the resident's/non-physician practitioner's note, except where noted  All available labs and Radiology studies were reviewed  At this point I agree with the current assessment done in the Emergency Department  I have conducted an independent evaluation of this patient a history and physical is as follows:    48 YO female presents with concern for new onset seizure  Pt was at home, apparently, per family, she syncopized on the stairs and slid down  It was then noticed that the pt was having facial twitching and confusion, repetitive questioning  EMS was call  In the ED pt did have a tonic-clonic seizure lasting ~1 minute  Pt did not previously have seizures  Gen: Pt is in NAD  HEENT: Head is atraumatic, EOM's intact, neck has FROM  Chest: CTAB, non-tender  Heart: RRR  Abdomen: Soft, NT/ND  Musculoskeletal: FROM in all extremities  Skin: No rash, no ecchymosis  Neuro: Awake, alert, oriented x4; Cranial nerves II-XII intact  Psych: Normal affect    MDM - Called to room as pt was having seizure activity  She was noted to have tonic-clonic shaking, did bite her lip  Will obtain CT head for ICH, electrolytes, urine for infection, CBC  Will give Keppra  Pt will be admitted      Critical Care Time  CritCare Time    Procedures

## 2017-12-29 NOTE — PROCEDURES
ELECTROENCEPHALOGRAM    Patient Name:  Axel Vasquez  MRN: [id number]   :  1965 File #: [id number]   Age: 47 y o  Encounter #: [id number]   Date performed: 2017 Referring Provider: LUAN Avendano          Report date: 2017          Study type: awake and drowsy EEG    ICD 10 diagnosis: Recurrent Seizures G40 909 and Spells/Fit NOS R56 9    Patient History:  EEG is requested to assess for seizures and/or classification of epilepsy  Patient is 47 y o  female who has had recurrent seizures in the setting of alcohol withdrawal, presenting to hospital after falling down stairs and having seizure like activity  She had been drinking "moonshine" and used cocaine prior to her fall  She has a history of hypothyroidism and depression  Current AEDs:  Medications include:   folic acid 1 mg Oral Daily   levothyroxine 75 mcg Oral Early Morning   thiamine 100 mg Oral Daily       Description of Procedure: The EEG was performed with electrodes applied using the International 10-20 System  Additional electrodes used included EOG, ECG and T1/T2 electrodes  A single lead ECG channel is also present  At least 16 channels are reviewed at a time and formatted into longitudinal bipolar, transverse bipolar, and referential (to common reference or calculated common reference) montages  The EEG was recorded with the patient awake, drowsy, and asleep  The recording was technically satisfactory  EEG was recorded from 14:15 to 14:49  Findings:   Background Activity: The background is grossly symmetric with respect to voltages and activities  During wakefulness, the background is well-organized with anterior low amplitude beta activity and low-moderate amplitude posterior alpha activity  There is a symmetric 9-9 5 Hz posterior dominant rhythm that attenuates with eye opening        Drowsiness is characterized by attentuation of the alpha rhythm, prominent anterior beta activity, central theta activity, roving eye movements and vertex waves  Stage 2 sleep is characterized by symmetric sleep spindles and K-complexes  REM sleep is captured  Activation Procedures:   Hyperventilation was not performed  Stepped photic stimulation from 1 to 30 fps was performed and produced symmetric photic driving response  Abnormal Findings:  During wakefulness, there are 1-3 second bursts of diffuse moderate voltage 2-3 Hz delta activity  Other findings: The single lead ECG shows a sinus rhythm and sinus arrhythmia  Interpretation: This is an abnormal 34 minutes awake, drowsy, and asleep EEG due to recurrent bursts of diffuse delta activity  This finding is etiologically nonspecific for mild-moderate diffuse cerebral dysfunction  The lack of epileptiform discharges on a routine EEG does not preclude the diagnosis of seizures or epilepsy        Prachi Hopper MD  [facility]  [address] [place] [address]

## 2017-12-29 NOTE — SOCIAL WORK
Referred pt to [facility] program for drug and alcohol assessment, however pt refused assessment when [facility] program representative came to see her today

## 2018-01-17 NOTE — CASE MANAGEMENT
Notification of Discharge  This is a Notification of Discharge from our facility [address]  Please be advised that this patient has been discharge from our facility  Below you will find the admission and discharge date and time including the patients disposition  PRESENTATION DATE: 12/28/2017  1:45 PM  IP ADMISSION DATE: 12/28/17 1756  DISCHARGE DATE: 12/29/2017  5:35 PM  DISPOSITION: Home/Self Care    [facility] in the [facility] by Sim[facility] Kay for 2017  Network Utilization Review Department  Phone: [phone]; Fax [phone]  ATTENTION: The Network Utilization Review Department is now centralized for our 7 Facilities  Make a note that we have a new phone and fax numbers for our Department  Please call with any questions or concerns to [phone] and carefully follow the prompts so that you are directed to the right person  All voicemails are confidential  Fax any determinations, approvals, denials, and requests for initial or continue stay review clinical to [phone]  Due to HIGH CALL volume, it would be easier if you could please send faxed requests to expedite your requests and in part, help us provide discharge notifications faster

## 2018-01-18 NOTE — CASE MANAGEMENT
Notification of Discharge  This is a Notification of Discharge from our facility [address]  Please be advised that this patient has been discharge from our facility  Below you will find the admission and discharge date and time including the patients disposition  PRESENTATION DATE: 12/28/2017  1:45 PM  IP ADMISSION DATE: 12/28/17 1756  DISCHARGE DATE: 12/29/2017  5:35 PM  DISPOSITION: Home/Self Care    [facility] in the [facility] by [address] for 2017  Network Utilization Review Department  Phone: [phone]; Fax [phone]  ATTENTION: The Network Utilization Review Department is now centralized for our 7 Facilities  Make a note that we have a new phone and fax numbers for our Department  Please call with any questions or concerns to [phone] and carefully follow the prompts so that you are directed to the right person  All voicemails are confidential  Fax any determinations, approvals, denials, and requests for initial or continue stay review clinical to [phone]  Due to HIGH CALL volume, it would be easier if you could please send faxed requests to expedite your requests and in part, help us provide discharge notifications faster

## 2018-01-23 NOTE — PROCEDURES
Procedures by Felecia Tam MD at  2017  3:24 PM      Author:  Felecia Tam MD Service:  Neurology Author Type:  Physician    Filed:  2017  3:37 PM Date of Service:  2017  3:24 PM Status:  Signed    :  Felecia Tam MD (Physician)        Procedure Orders:       1  EEG awake or drowsy routine [[id number]] ordered by LUAN Silva at 17 1255                  ELECTROENCEPHALOGRAM    Patient Name:  Papito Canales  MRN: [id number]   :  1965 File #: [facility] 17-80   Age: 47 y o  Encounter #: [id number]   Date performed: 2017 Referring Provider: LUAN Andino          Report date: 2017          Study type: awake and drowsy EEG    ICD 10 diagnosis: Recurrent Seizures G40 909 and Spells/Fit NOS R56 9    Patient History:  EEG is requested to assess for seizures and/or classification of epilepsy  Patient is 47 y o  female who has had recurrent seizures in the setting of alcohol withdrawal, presenting to hospital after falling down stairs and having seizure  like activity  She had been drinking moonshine and used cocaine prior to her fall  She has a history of hypothyroidism and depression  Current AEDs:  Medications include:   folic acid 1 mg Oral Daily   levothyroxine 75 mcg Oral Early Morning   thiamine 100 mg Oral Daily       Description of Procedure: The EEG was performed with electrodes applied using the International 10-20 System  Additional electrodes used included EOG, ECG and T1/T2 electrodes  A single lead ECG channel is also  present  At least 16 channels are reviewed at a time  and formatted into longitudinal bipolar, transverse bipolar, and referential (to common reference or calculated common reference) montages  The EEG was recorded  with the patient awake, drowsy, and asleep  The recording was technically satisfactory  EEG was recorded from 14:15 to 14:49  Findings:   Background Activity:    The background is grossly symmetric with respect to voltages and activities  During wakefulness, the background is well-organized with anterior low amplitude beta activity and low-moderate amplitude  posterior alpha activity  There is a symmetric 9-9 5 Hz posterior dominant rhythm that attenuates with eye opening  Drowsiness is characterized by attentuation of the alpha rhythm, prominent anterior beta activity, central theta activity, roving eye movements and vertex waves  Stage 2 sleep is characterized by symmetric sleep spindles and K-complexes  REM sleep is captured  Activation Procedures:   Hyperventilation was not performed  Stepped photic stimulation from 1 to 30 fps was performed and produced symmetric photic driving response  Abnormal Findings:  During wakefulness, there are 1-3 second bursts of diffuse moderate voltage 2-3 Hz delta activity  Other findings: The single lead ECG shows a sinus rhythm and sinus arrhythmia  Interpretation: This is an abnormal 34 minutes awake, drowsy, and asleep EEG due to recurrent bursts of diffuse delta activity  This finding is etiologically nonspecific for mild-moderate diffuse cerebral dysfunction  The lack of epileptiform discharges on a routine EEG does not preclude the diagnosis of seizures or epilepsy  MD [facility]  Mario HUIZAR    Dec 29 2017  3:37PM [facility] Standard Time

## 2018-03-14 ENCOUNTER — DOCUMENTATION (OUTPATIENT)
Dept: INTERNAL MEDICINE UNIT | Facility: HOSPITAL | Age: 53
End: 2018-03-14

## 2020-05-07 ENCOUNTER — APPOINTMENT (EMERGENCY)
Dept: CT IMAGING | Facility: HOSPITAL | Age: 55
End: 2020-05-07
Payer: COMMERCIAL

## 2020-05-07 ENCOUNTER — APPOINTMENT (EMERGENCY)
Dept: RADIOLOGY | Facility: HOSPITAL | Age: 55
End: 2020-05-07
Payer: COMMERCIAL

## 2020-05-07 ENCOUNTER — HOSPITAL ENCOUNTER (EMERGENCY)
Facility: HOSPITAL | Age: 55
Discharge: HOME | End: 2020-05-07
Attending: EMERGENCY MEDICINE
Payer: COMMERCIAL

## 2020-05-07 VITALS
OXYGEN SATURATION: 94 % | TEMPERATURE: 99.2 F | DIASTOLIC BLOOD PRESSURE: 73 MMHG | HEART RATE: 112 BPM | RESPIRATION RATE: 18 BRPM | SYSTOLIC BLOOD PRESSURE: 164 MMHG

## 2020-05-07 DIAGNOSIS — G40.909: ICD-10-CM

## 2020-05-07 DIAGNOSIS — S00.03XA SCALP HEMATOMA: ICD-10-CM

## 2020-05-07 DIAGNOSIS — R56.9 SEIZURE (HCC): Primary | ICD-10-CM

## 2020-05-07 DIAGNOSIS — R41.82 ALTERED MENTAL STATUS: ICD-10-CM

## 2020-05-07 DIAGNOSIS — S00.83XA FACIAL CONTUSION: ICD-10-CM

## 2020-05-07 DIAGNOSIS — S00.93XA HEAD CONTUSION: ICD-10-CM

## 2020-05-07 LAB
ALBUMIN SERPL BCP-MCNC: 3.5 G/DL (ref 3.5–5)
ALP SERPL-CCNC: 95 U/L (ref 46–116)
ALT SERPL W P-5'-P-CCNC: 30 U/L (ref 12–78)
ANION GAP SERPL CALCULATED.3IONS-SCNC: 13 MMOL/L (ref 4–13)
APAP SERPL-MCNC: <2 UG/ML (ref 10–20)
APTT PPP: 27 SECONDS (ref 23–37)
AST SERPL W P-5'-P-CCNC: 24 U/L (ref 5–45)
ATRIAL RATE: 129 BPM
BASOPHILS # BLD AUTO: 0.04 THOUSANDS/ΜL (ref 0–0.1)
BASOPHILS NFR BLD AUTO: 0 % (ref 0–1)
BILIRUB SERPL-MCNC: 0.19 MG/DL (ref 0.2–1)
BUN SERPL-MCNC: 18 MG/DL (ref 5–25)
CALCIUM SERPL-MCNC: 8.7 MG/DL (ref 8.3–10.1)
CHLORIDE SERPL-SCNC: 106 MMOL/L (ref 100–108)
CK SERPL-CCNC: 122 U/L (ref 26–192)
CO2 SERPL-SCNC: 24 MMOL/L (ref 21–32)
CREAT SERPL-MCNC: 1.12 MG/DL (ref 0.6–1.3)
EOSINOPHIL # BLD AUTO: 0.2 THOUSAND/ΜL (ref 0–0.61)
EOSINOPHIL NFR BLD AUTO: 2 % (ref 0–6)
ERYTHROCYTE [DISTWIDTH] IN BLOOD BY AUTOMATED COUNT: 13.7 % (ref 11.6–15.1)
ETHANOL SERPL-MCNC: <3 MG/DL (ref 0–3)
GFR SERPL CREATININE-BSD FRML MDRD: 56 ML/MIN/1.73SQ M
GLUCOSE SERPL-MCNC: 118 MG/DL (ref 65–140)
GLUCOSE SERPL-MCNC: 135 MG/DL (ref 65–140)
HCT VFR BLD AUTO: 42.1 % (ref 34.8–46.1)
HGB BLD-MCNC: 13.4 G/DL (ref 11.5–15.4)
IMM GRANULOCYTES # BLD AUTO: 0.23 THOUSAND/UL (ref 0–0.2)
IMM GRANULOCYTES NFR BLD AUTO: 2 % (ref 0–2)
INR PPP: 0.91 (ref 0.84–1.19)
LACTATE SERPL-SCNC: 3.2 MMOL/L (ref 0.5–2)
LIPASE SERPL-CCNC: 96 U/L (ref 73–393)
LYMPHOCYTES # BLD AUTO: 1.44 THOUSANDS/ΜL (ref 0.6–4.47)
LYMPHOCYTES NFR BLD AUTO: 15 % (ref 14–44)
MCH RBC QN AUTO: 30 PG (ref 26.8–34.3)
MCHC RBC AUTO-ENTMCNC: 31.8 G/DL (ref 31.4–37.4)
MCV RBC AUTO: 94 FL (ref 82–98)
MONOCYTES # BLD AUTO: 0.29 THOUSAND/ΜL (ref 0.17–1.22)
MONOCYTES NFR BLD AUTO: 3 % (ref 4–12)
NEUTROPHILS # BLD AUTO: 7.23 THOUSANDS/ΜL (ref 1.85–7.62)
NEUTS SEG NFR BLD AUTO: 78 % (ref 43–75)
NRBC BLD AUTO-RTO: 0 /100 WBCS
P AXIS: 66 DEGREES
PLATELET # BLD AUTO: 246 THOUSANDS/UL (ref 149–390)
PMV BLD AUTO: 9.9 FL (ref 8.9–12.7)
POTASSIUM SERPL-SCNC: 3.9 MMOL/L (ref 3.5–5.3)
PR INTERVAL: 148 MS
PROT SERPL-MCNC: 7.1 G/DL (ref 6.4–8.2)
PROTHROMBIN TIME: 12.3 SECONDS (ref 11.6–14.5)
QRS AXIS: 51 DEGREES
QRSD INTERVAL: 94 MS
QT INTERVAL: 280 MS
QTC INTERVAL: 410 MS
RBC # BLD AUTO: 4.46 MILLION/UL (ref 3.81–5.12)
SALICYLATES SERPL-MCNC: <3 MG/DL (ref 3–20)
SODIUM SERPL-SCNC: 143 MMOL/L (ref 136–145)
T WAVE AXIS: 48 DEGREES
TROPONIN I SERPL-MCNC: <0.02 NG/ML
TSH SERPL DL<=0.05 MIU/L-ACNC: 2.4 UIU/ML (ref 0.36–3.74)
VENTRICULAR RATE: 129 BPM
WBC # BLD AUTO: 9.43 THOUSAND/UL (ref 4.31–10.16)

## 2020-05-07 PROCEDURE — 80053 COMPREHEN METABOLIC PANEL: CPT | Performed by: EMERGENCY MEDICINE

## 2020-05-07 PROCEDURE — 82550 ASSAY OF CK (CPK): CPT | Performed by: EMERGENCY MEDICINE

## 2020-05-07 PROCEDURE — 99291 CRITICAL CARE FIRST HOUR: CPT | Performed by: EMERGENCY MEDICINE

## 2020-05-07 PROCEDURE — 96365 THER/PROPH/DIAG IV INF INIT: CPT

## 2020-05-07 PROCEDURE — 85610 PROTHROMBIN TIME: CPT | Performed by: EMERGENCY MEDICINE

## 2020-05-07 PROCEDURE — 83690 ASSAY OF LIPASE: CPT | Performed by: EMERGENCY MEDICINE

## 2020-05-07 PROCEDURE — 82948 REAGENT STRIP/BLOOD GLUCOSE: CPT

## 2020-05-07 PROCEDURE — 85730 THROMBOPLASTIN TIME PARTIAL: CPT | Performed by: EMERGENCY MEDICINE

## 2020-05-07 PROCEDURE — 96375 TX/PRO/DX INJ NEW DRUG ADDON: CPT

## 2020-05-07 PROCEDURE — 84443 ASSAY THYROID STIM HORMONE: CPT | Performed by: EMERGENCY MEDICINE

## 2020-05-07 PROCEDURE — 84484 ASSAY OF TROPONIN QUANT: CPT | Performed by: EMERGENCY MEDICINE

## 2020-05-07 PROCEDURE — 93005 ELECTROCARDIOGRAM TRACING: CPT

## 2020-05-07 PROCEDURE — 93010 ELECTROCARDIOGRAM REPORT: CPT | Performed by: INTERNAL MEDICINE

## 2020-05-07 PROCEDURE — 99285 EMERGENCY DEPT VISIT HI MDM: CPT

## 2020-05-07 PROCEDURE — 83605 ASSAY OF LACTIC ACID: CPT | Performed by: EMERGENCY MEDICINE

## 2020-05-07 PROCEDURE — 80320 DRUG SCREEN QUANTALCOHOLS: CPT | Performed by: EMERGENCY MEDICINE

## 2020-05-07 PROCEDURE — 80329 ANALGESICS NON-OPIOID 1 OR 2: CPT | Performed by: EMERGENCY MEDICINE

## 2020-05-07 PROCEDURE — 36415 COLL VENOUS BLD VENIPUNCTURE: CPT | Performed by: EMERGENCY MEDICINE

## 2020-05-07 PROCEDURE — 85025 COMPLETE CBC W/AUTO DIFF WBC: CPT | Performed by: EMERGENCY MEDICINE

## 2020-05-07 RX ORDER — MAGNESIUM SULFATE 1 G/100ML
1 INJECTION INTRAVENOUS ONCE
Status: COMPLETED | OUTPATIENT
Start: 2020-05-07 | End: 2020-05-07

## 2020-05-07 RX ORDER — LORAZEPAM 2 MG/ML
2 INJECTION INTRAMUSCULAR ONCE
Status: COMPLETED | OUTPATIENT
Start: 2020-05-07 | End: 2020-05-07

## 2020-05-07 RX ADMIN — LORAZEPAM 2 MG: 2 INJECTION, SOLUTION INTRAMUSCULAR; INTRAVENOUS at 14:37

## 2020-05-07 RX ADMIN — SODIUM CHLORIDE 1000 ML: 0.9 INJECTION, SOLUTION INTRAVENOUS at 14:57

## 2020-05-07 RX ADMIN — MAGNESIUM SULFATE HEPTAHYDRATE 1 G: 1 INJECTION, SOLUTION INTRAVENOUS at 14:52

## 2024-05-25 ENCOUNTER — APPOINTMENT (EMERGENCY)
Dept: CT IMAGING | Facility: HOSPITAL | Age: 59
End: 2024-05-25
Payer: COMMERCIAL

## 2024-05-25 ENCOUNTER — HOSPITAL ENCOUNTER (EMERGENCY)
Facility: HOSPITAL | Age: 59
Discharge: HOME | End: 2024-05-26
Attending: EMERGENCY MEDICINE | Admitting: EMERGENCY MEDICINE
Payer: COMMERCIAL

## 2024-05-25 DIAGNOSIS — R56.9 SEIZURE (HCC): Primary | ICD-10-CM

## 2024-05-25 LAB
ALBUMIN SERPL BCP-MCNC: 4.2 G/DL (ref 3.5–5)
ALP SERPL-CCNC: 83 U/L (ref 34–104)
ALT SERPL W P-5'-P-CCNC: 30 U/L (ref 7–52)
ANION GAP SERPL CALCULATED.3IONS-SCNC: 10 MMOL/L (ref 4–13)
AST SERPL W P-5'-P-CCNC: 37 U/L (ref 13–39)
ATRIAL RATE: 90 BPM
BACTERIA UR QL AUTO: ABNORMAL /HPF
BASOPHILS # BLD AUTO: 0.02 THOUSANDS/ÂΜL (ref 0–0.1)
BASOPHILS NFR BLD AUTO: 0 % (ref 0–1)
BILIRUB SERPL-MCNC: 0.34 MG/DL (ref 0.2–1)
BILIRUB UR QL STRIP: NEGATIVE
BUN SERPL-MCNC: 13 MG/DL (ref 5–25)
CALCIUM SERPL-MCNC: 9.8 MG/DL (ref 8.4–10.2)
CHLORIDE SERPL-SCNC: 109 MMOL/L (ref 96–108)
CLARITY UR: CLEAR
CO2 SERPL-SCNC: 24 MMOL/L (ref 21–32)
COLOR UR: YELLOW
CREAT SERPL-MCNC: 0.74 MG/DL (ref 0.6–1.3)
EOSINOPHIL # BLD AUTO: 0.07 THOUSAND/ÂΜL (ref 0–0.61)
EOSINOPHIL NFR BLD AUTO: 1 % (ref 0–6)
ERYTHROCYTE [DISTWIDTH] IN BLOOD BY AUTOMATED COUNT: 13.2 % (ref 11.6–15.1)
GFR SERPL CREATININE-BSD FRML MDRD: 90 ML/MIN/1.73SQ M
GLUCOSE SERPL-MCNC: 121 MG/DL (ref 65–140)
GLUCOSE SERPL-MCNC: 131 MG/DL (ref 65–140)
GLUCOSE UR STRIP-MCNC: NEGATIVE MG/DL
HCT VFR BLD AUTO: 41 % (ref 34.8–46.1)
HGB BLD-MCNC: 13.8 G/DL (ref 11.5–15.4)
HGB UR QL STRIP.AUTO: ABNORMAL
IMM GRANULOCYTES # BLD AUTO: 0.09 THOUSAND/UL (ref 0–0.2)
IMM GRANULOCYTES NFR BLD AUTO: 1 % (ref 0–2)
KETONES UR STRIP-MCNC: NEGATIVE MG/DL
LEUKOCYTE ESTERASE UR QL STRIP: NEGATIVE
LEVETIRACETAM SERPL-MCNC: <2 UG/ML (ref 12–46)
LYMPHOCYTES # BLD AUTO: 0.81 THOUSANDS/ÂΜL (ref 0.6–4.47)
LYMPHOCYTES NFR BLD AUTO: 11 % (ref 14–44)
MCH RBC QN AUTO: 30.5 PG (ref 26.8–34.3)
MCHC RBC AUTO-ENTMCNC: 33.7 G/DL (ref 31.4–37.4)
MCV RBC AUTO: 91 FL (ref 82–98)
MONOCYTES # BLD AUTO: 0.28 THOUSAND/ÂΜL (ref 0.17–1.22)
MONOCYTES NFR BLD AUTO: 4 % (ref 4–12)
NEUTROPHILS # BLD AUTO: 6.19 THOUSANDS/ÂΜL (ref 1.85–7.62)
NEUTS SEG NFR BLD AUTO: 83 % (ref 43–75)
NITRITE UR QL STRIP: NEGATIVE
NON-SQ EPI CELLS URNS QL MICRO: ABNORMAL /HPF
NRBC BLD AUTO-RTO: 0 /100 WBCS
P AXIS: 70 DEGREES
PH UR STRIP.AUTO: 5.5 [PH] (ref 4.5–8)
PLATELET # BLD AUTO: 228 THOUSANDS/UL (ref 149–390)
PMV BLD AUTO: 9.8 FL (ref 8.9–12.7)
POTASSIUM SERPL-SCNC: 4.6 MMOL/L (ref 3.5–5.3)
PR INTERVAL: 160 MS
PROT SERPL-MCNC: 7.1 G/DL (ref 6.4–8.4)
PROT UR STRIP-MCNC: NEGATIVE MG/DL
QRS AXIS: 20 DEGREES
QRSD INTERVAL: 94 MS
QT INTERVAL: 368 MS
QTC INTERVAL: 450 MS
RBC # BLD AUTO: 4.53 MILLION/UL (ref 3.81–5.12)
RBC #/AREA URNS AUTO: ABNORMAL /HPF
SODIUM SERPL-SCNC: 143 MMOL/L (ref 135–147)
SP GR UR STRIP.AUTO: 1.02 (ref 1–1.03)
T WAVE AXIS: 54 DEGREES
UROBILINOGEN UR QL STRIP.AUTO: 0.2 E.U./DL
VENTRICULAR RATE: 90 BPM
WBC # BLD AUTO: 7.46 THOUSAND/UL (ref 4.31–10.16)
WBC #/AREA URNS AUTO: ABNORMAL /HPF

## 2024-05-25 PROCEDURE — 99285 EMERGENCY DEPT VISIT HI MDM: CPT

## 2024-05-25 PROCEDURE — 83520 IMMUNOASSAY QUANT NOS NONAB: CPT | Performed by: PHYSICIAN ASSISTANT

## 2024-05-25 PROCEDURE — 82948 REAGENT STRIP/BLOOD GLUCOSE: CPT

## 2024-05-25 PROCEDURE — 80053 COMPREHEN METABOLIC PANEL: CPT | Performed by: PHYSICIAN ASSISTANT

## 2024-05-25 PROCEDURE — 93005 ELECTROCARDIOGRAM TRACING: CPT

## 2024-05-25 PROCEDURE — 70450 CT HEAD/BRAIN W/O DYE: CPT

## 2024-05-25 PROCEDURE — 96361 HYDRATE IV INFUSION ADD-ON: CPT

## 2024-05-25 PROCEDURE — 81001 URINALYSIS AUTO W/SCOPE: CPT

## 2024-05-25 PROCEDURE — 93010 ELECTROCARDIOGRAM REPORT: CPT | Performed by: INTERNAL MEDICINE

## 2024-05-25 PROCEDURE — 85025 COMPLETE CBC W/AUTO DIFF WBC: CPT | Performed by: PHYSICIAN ASSISTANT

## 2024-05-25 PROCEDURE — 36415 COLL VENOUS BLD VENIPUNCTURE: CPT | Performed by: PHYSICIAN ASSISTANT

## 2024-05-25 PROCEDURE — 96374 THER/PROPH/DIAG INJ IV PUSH: CPT

## 2024-05-25 PROCEDURE — 99285 EMERGENCY DEPT VISIT HI MDM: CPT | Performed by: PHYSICIAN ASSISTANT

## 2024-05-25 RX ORDER — LEVETIRACETAM 500 MG/5ML
1500 INJECTION, SOLUTION, CONCENTRATE INTRAVENOUS ONCE
Status: COMPLETED | OUTPATIENT
Start: 2024-05-25 | End: 2024-05-25

## 2024-05-25 RX ADMIN — SODIUM CHLORIDE 500 ML: 0.9 INJECTION, SOLUTION INTRAVENOUS at 21:52

## 2024-05-25 RX ADMIN — LEVETIRACETAM 1500 MG: 100 INJECTION, SOLUTION INTRAVENOUS at 21:52

## 2024-05-26 VITALS
WEIGHT: 200 LBS | BODY MASS INDEX: 33.32 KG/M2 | OXYGEN SATURATION: 94 % | HEIGHT: 65 IN | TEMPERATURE: 99.5 F | HEART RATE: 81 BPM | RESPIRATION RATE: 18 BRPM | SYSTOLIC BLOOD PRESSURE: 126 MMHG | DIASTOLIC BLOOD PRESSURE: 61 MMHG

## 2024-05-26 RX ORDER — ACETAMINOPHEN 325 MG/1
650 TABLET ORAL ONCE
Status: COMPLETED | OUTPATIENT
Start: 2024-05-26 | End: 2024-05-26

## 2024-05-26 RX ADMIN — ACETAMINOPHEN 650 MG: 325 TABLET, FILM COATED ORAL at 00:42

## 2024-05-26 NOTE — ED NOTES
Patient's son called regarding patient discharge. States he will come pick patient up.     Maricel Johnson RN  05/26/24 0023

## 2024-05-26 NOTE — DISCHARGE INSTRUCTIONS
Please refer to the attached information for strict return instructions. If symptoms worsen or new symptoms develop please return to the ER. Please resume taking your Keppra (levetiracetam) as prescribed, avoid further heavy alcohol consumption.

## 2024-05-26 NOTE — ED PROVIDER NOTES
"History  Chief Complaint   Patient presents with    Seizure - Prior Hx Of     Patient with witnessed tonic clonic seizure tonight. EMS reporting it lasted approximately 1 minute. Prior hx but has not been taking meds. Upon their arrival patient was alert but confused. Patient states she drank about half a handle of vodka tonight and has not had her keppra for 3 days due to running out. Disoriented to time.     Marielos is a 57 yo F, history of seizures, presenting with witnessed seizure this evening shortly prior to arrival. Per son over phone, she was in bed, got up to walk to the bathroom and on the way she screamed, fell, and had seizure activity including generalized shaking, urinary incontinence. She was confused following this but at baseline by time of my evaluation. She admits to having several servings of vodka this evening. Notes previous seizures tend to be triggered by alcohol consumption. Admits to not taking keppra for \"a couple days\".       History provided by:  Patient and relative   used: No        Prior to Admission Medications   Prescriptions Last Dose Informant Patient Reported? Taking?   levothyroxine 75 mcg tablet   Yes No   Sig: Take 75 mcg by mouth daily in the early morning   sertraline (ZOLOFT) 100 mg tablet   Yes No   Sig: Take 150 mg by mouth daily      Facility-Administered Medications: None       Past Medical History:   Diagnosis Date    Alcohol abuse     Alcohol related seizure (HCC)     Anxiety with depression     B12 deficiency     GI bleed     Hypothyroidism (acquired)     Psychiatric disorder        History reviewed. No pertinent surgical history.    History reviewed. No pertinent family history.  I have reviewed and agree with the history as documented.    E-Cigarette/Vaping    E-Cigarette Use Never User      E-Cigarette/Vaping Substances     Social History     Tobacco Use    Smoking status: Former    Smokeless tobacco: Never   Vaping Use    Vaping status: " Never Used   Substance Use Topics    Alcohol use: No    Drug use: No       Review of Systems   Constitutional:  Negative for chills and fever.   HENT:  Negative for congestion, rhinorrhea and sore throat.    Eyes:  Negative for pain and visual disturbance.   Respiratory:  Negative for cough, shortness of breath and wheezing.    Cardiovascular:  Negative for chest pain and palpitations.   Gastrointestinal:  Negative for abdominal pain, nausea and vomiting.   Genitourinary:  Negative for dysuria, frequency and urgency.   Musculoskeletal:  Negative for back pain, neck pain and neck stiffness.   Skin:  Negative for rash and wound.   Neurological:  Positive for seizures. Negative for dizziness, weakness, light-headedness and numbness.       Physical Exam  Physical Exam  Constitutional:       General: She is not in acute distress.     Appearance: She is well-developed. She is not diaphoretic.   HENT:      Head: Normocephalic and atraumatic.      Right Ear: Tympanic membrane and external ear normal. No hemotympanum.      Left Ear: Tympanic membrane and external ear normal. No hemotympanum.      Nose: Nose normal.      Mouth/Throat:      Comments: Several superficial tongue lacerations laterally. No active bleeding  Eyes:      Extraocular Movements:      Right eye: Normal extraocular motion.      Left eye: Normal extraocular motion.      Conjunctiva/sclera: Conjunctivae normal.      Pupils: Pupils are equal, round, and reactive to light.   Cardiovascular:      Rate and Rhythm: Normal rate and regular rhythm.   Pulmonary:      Effort: Pulmonary effort is normal. No accessory muscle usage or respiratory distress.      Breath sounds: No wheezing or rales.   Abdominal:      General: Abdomen is flat. There is no distension.      Tenderness: There is no abdominal tenderness.   Musculoskeletal:      Cervical back: Normal range of motion. No rigidity.      Comments: No midline spinal tenderness.  Normal range of motion of  extremities x 4 without bony tenderness.   Skin:     General: Skin is warm and dry.      Capillary Refill: Capillary refill takes less than 2 seconds.      Findings: No erythema or rash.   Neurological:      Mental Status: She is alert and oriented to person, place, and time.      Sensory: Sensation is intact.      Motor: Motor function is intact. No abnormal muscle tone.      Coordination: Coordination is intact. Coordination normal.      Gait: Gait is intact.   Psychiatric:         Behavior: Behavior normal.         Thought Content: Thought content normal.         Judgment: Judgment normal.         Vital Signs  ED Triage Vitals [05/25/24 2047]   Temperature Pulse Respirations Blood Pressure SpO2   99.5 °F (37.5 °C) 92 18 143/65 90 %      Temp Source Heart Rate Source Patient Position - Orthostatic VS BP Location FiO2 (%)   Oral -- -- -- --      Pain Score       No Pain           Vitals:    05/25/24 2047 05/25/24 2315 05/26/24 0000   BP: 143/65 123/53 126/61   Pulse: 92 80 81         Visual Acuity      ED Medications  Medications   acetaminophen (TYLENOL) tablet 650 mg (has no administration in time range)   sodium chloride 0.9 % bolus 500 mL (0 mL Intravenous Stopped 5/25/24 2252)   levETIRAcetam (KEPPRA) injection 1,500 mg (1,500 mg Intravenous Given 5/25/24 2152)       Diagnostic Studies  Results Reviewed       Procedure Component Value Units Date/Time    Comprehensive metabolic panel [[id number]]  (Abnormal) Collected: 05/25/24 2152    Lab Status: Final result Specimen: Blood from Arm, Left Updated: 05/25/24 2234     Sodium 143 mmol/L      Potassium 4.6 mmol/L      Chloride 109 mmol/L      CO2 24 mmol/L      ANION GAP 10 mmol/L      BUN 13 mg/dL      Creatinine 0.74 mg/dL      Glucose 121 mg/dL      Calcium 9.8 mg/dL      AST 37 U/L      ALT 30 U/L      Alkaline Phosphatase 83 U/L      Total Protein 7.1 g/dL      Albumin 4.2 g/dL      Total Bilirubin 0.34 mg/dL      eGFR 90 ml/min/1.73sq m     Narrative:       "National Kidney Disease Foundation guidelines for Chronic Kidney Disease (CKD):     Stage 1 with normal or high GFR (GFR > 90 mL/min/1.73 square meters)    Stage 2 Mild CKD (GFR = 60-89 mL/min/1.73 square meters)    Stage 3A Moderate CKD (GFR = 45-59 mL/min/1.73 square meters)    Stage 3B Moderate CKD (GFR = 30-44 mL/min/1.73 square meters)    Stage 4 Severe CKD (GFR = 15-29 mL/min/1.73 square meters)    Stage 5 End Stage CKD (GFR <15 mL/min/1.73 square meters)  Note: GFR calculation is accurate only with a steady state creatinine    Levetiracetam level [[id number]]  (Abnormal) Collected: 05/25/24 2152    Lab Status: Final result Specimen: Blood from Arm, Left Updated: 05/25/24 2234     Levetiracetam Lvl <2.0 ug/mL     Narrative:      \"Brivaracetam (Briviact) interferes with measurements of levetiracetam in the ARK Levetiracetam Assay. Patients undergoing a switch in drug therapy involving Keppra and Briviact should not be monitored for levetiracetam using the ARK assay. Serum levels of levetiracetam and or brivaracetam should be confirmed by a valid chromatographic method if there is a possibility these drugs are co-present in circulation.\"    Urine Microscopic [[id number]]  (Abnormal) Collected: 05/25/24 2155    Lab Status: Final result Specimen: Urine, Clean Catch Updated: 05/25/24 2223     RBC, UA 1-2 /hpf      WBC, UA 2-4 /hpf      Epithelial Cells Occasional /hpf      Bacteria, UA Occasional /hpf     CBC and differential [[id number]]  (Abnormal) Collected: 05/25/24 2152    Lab Status: Final result Specimen: Blood from Arm, Left Updated: 05/25/24 2158     WBC 7.46 Thousand/uL      RBC 4.53 Million/uL      Hemoglobin 13.8 g/dL      Hematocrit 41.0 %      MCV 91 fL      MCH 30.5 pg      MCHC 33.7 g/dL      RDW 13.2 %      MPV 9.8 fL      Platelets 228 Thousands/uL      nRBC 0 /100 WBCs      Segmented % 83 %      Immature Grans % 1 %      Lymphocytes % 11 %      Monocytes % 4 %      Eosinophils Relative 1 %      " Basophils Relative 0 %      Absolute Neutrophils 6.19 Thousands/µL      Absolute Immature Grans 0.09 Thousand/uL      Absolute Lymphocytes 0.81 Thousands/µL      Absolute Monocytes 0.28 Thousand/µL      Eosinophils Absolute 0.07 Thousand/µL      Basophils Absolute 0.02 Thousands/µL     Urine Macroscopic, POC [[id number]]  (Abnormal) Collected: 05/25/24 2155    Lab Status: Final result Specimen: Urine Updated: 05/25/24 2157     Color, UA Yellow     Clarity, UA Clear     pH, UA 5.5     Leukocytes, UA Negative     Nitrite, UA Negative     Protein, UA Negative mg/dl      Glucose, UA Negative mg/dl      Ketones, UA Negative mg/dl      Urobilinogen, UA 0.2 E.U./dl      Bilirubin, UA Negative     Occult Blood, UA Small     Specific Gravity, UA 1.020    Narrative:      CLINITEK RESULT    Fingerstick Glucose (POCT) [[id number]]  (Normal) Collected: 05/25/24 2045    Lab Status: Final result Specimen: Blood Updated: 05/25/24 2046     POC Glucose 131 mg/dl                    CT head without contrast   Final Result by Evelia Esteban MD (05/26 0008)      No acute intracranial abnormality.                  Workstation performed: [id number]                    Procedures  ECG 12 Lead Documentation Only    Date/Time: 5/25/2024 9:55 PM    Performed by: Tao Bailey PA-C  Authorized by: Tao Bailey PA-C    Indications / Diagnosis:  Seizure activity  ECG reviewed by me, the ED Provider: yes    Patient location:  ED  Previous ECG:     Previous ECG:  Compared to current    Similarity:  No change    Comparison to cardiac monitor: Yes    Interpretation:     Interpretation: non-specific    Rate:     ECG rate:  90    ECG rate assessment: normal    Rhythm:     Rhythm: sinus rhythm    Ectopy:     Ectopy: none    QRS:     QRS axis:  Normal    QRS intervals:  Normal  Conduction:     Conduction: normal    ST segments:     ST segments:  Normal  T waves:     T waves: normal             ED Course                                              Medical Decision Making  Generalized seizure, witnessed by family occurring shortly prior to arrival this evening after consumption of alcohol reported outpatient.  She did have a fall just prior to the seizure, no apparent traumatic injuries on exam or pain reported by patient.  Her neurologic exam is nonfocal here.  She admits to not taking her prescribed Keppra in several days.  Lab workup including CBC, CMP for electrolytes, EKG within normal limits grossly.  Her Keppra level is undetectable assisting with history.  Given loading dose here.  CT head is without acute traumatic abnormality.  Advised to resume prescribed Keppra upon discharge.  Follow-up with primary care physician advised.  Discharged in care of family.  Return indications reviewed.    Amount and/or Complexity of Data Reviewed  Labs: ordered.  Radiology: ordered.    Risk  OTC drugs.  Prescription drug management.             Disposition  Final diagnoses:   Seizure (HCC)     Time reflects when diagnosis was documented in both MDM as applicable and the Disposition within this note       Time User Action Codes Description Comment    5/26/2024 12:15 AM Tao Bailey [R56.9] Seizure (HCC)           ED Disposition       ED Disposition   Discharge    Condition   Stable    Date/Time   Sun May 26, 2024 12:14 AM    Comment   Marielos Barlow discharge to home/self care.                   Follow-up Information       Follow up With Specialties Details Why Contact Info Additional Information    Nicci Sprague,  Family Medicine Schedule an appointment as soon as possible for a visit   [address] [facility] [address]   [facility] [address] [phone]       [facility] Emergency Department Emergency Medicine  If symptoms worsen [address] [facility] [address] [facility] [address] [phone] [facility] Emergency Department, [address], [place], [address]             Patient's Medications   Discharge Prescriptions    No medications on file       No discharge procedures on file.    PDMP Review       None            ED Provider  Electronically Signed by             Tao Bailey PA-C  05/26/24 0031

## 2024-05-26 NOTE — ED NOTES
Pt called in d/t not knowing where prescription was sent. RN clicked into chart to see     Julia Leschinsky, RN  05/26/24 3176